# Patient Record
Sex: FEMALE | Race: WHITE | NOT HISPANIC OR LATINO | Employment: UNEMPLOYED | URBAN - METROPOLITAN AREA
[De-identification: names, ages, dates, MRNs, and addresses within clinical notes are randomized per-mention and may not be internally consistent; named-entity substitution may affect disease eponyms.]

---

## 2017-01-06 ENCOUNTER — POSTPARTUM VISIT (OUTPATIENT)
Dept: OBSTETRICS AND GYNECOLOGY | Facility: CLINIC | Age: 27
End: 2017-01-06
Payer: MEDICAID

## 2017-01-06 VITALS
WEIGHT: 212 LBS | DIASTOLIC BLOOD PRESSURE: 60 MMHG | SYSTOLIC BLOOD PRESSURE: 122 MMHG | HEART RATE: 70 BPM | BODY MASS INDEX: 40.06 KG/M2 | RESPIRATION RATE: 13 BRPM

## 2017-01-06 DIAGNOSIS — Z48.02 ENCOUNTER FOR STAPLE REMOVAL: Primary | ICD-10-CM

## 2017-01-06 DIAGNOSIS — Z09 POSTOPERATIVE FOLLOW-UP: ICD-10-CM

## 2017-01-06 PROCEDURE — 99999 PR PBB SHADOW E&M-EST. PATIENT-LVL III: CPT | Mod: PBBFAC,,, | Performed by: OBSTETRICS & GYNECOLOGY

## 2017-01-06 PROCEDURE — 99213 OFFICE O/P EST LOW 20 MIN: CPT | Mod: PBBFAC | Performed by: OBSTETRICS & GYNECOLOGY

## 2017-01-06 PROCEDURE — 99024 POSTOP FOLLOW-UP VISIT: CPT | Mod: ,,, | Performed by: OBSTETRICS & GYNECOLOGY

## 2017-01-06 NOTE — PROGRESS NOTES
Subjective:       Patient ID: Kate Gomez is a 26 y.o. female.    Chief Complaint:  Suture / Staple Removal (10 days PO )      History of Present Illness  Patient presents for staple removal following  section.  Patient states her pain is under control and she has minimal bleeding.  She missed a voiding well and has good appetite.    Menstrual History:  OB History      Para Term  AB TAB SAB Ectopic Multiple Living    2 2 2      0 2         Menarche age:   Patient's last menstrual period was 2016 (exact date).         Review of Systems  Review of Systems   Constitutional: Negative for activity change, appetite change, chills, diaphoresis, fatigue, fever and unexpected weight change.   HENT: Negative for congestion, dental problem, drooling, ear discharge, ear pain, facial swelling, hearing loss, mouth sores, nosebleeds, postnasal drip, rhinorrhea, sinus pressure, sneezing, sore throat, tinnitus, trouble swallowing and voice change.    Eyes: Negative for photophobia, pain, discharge, redness, itching and visual disturbance.   Respiratory: Negative for apnea, cough, choking, chest tightness, shortness of breath, wheezing and stridor.    Cardiovascular: Negative for chest pain, palpitations and leg swelling.   Gastrointestinal: Negative for abdominal distention, abdominal pain, anal bleeding, blood in stool, constipation, diarrhea, nausea, rectal pain and vomiting.   Endocrine: Negative for cold intolerance, heat intolerance, polydipsia, polyphagia and polyuria.   Genitourinary: Negative for decreased urine volume, difficulty urinating, dyspareunia, dysuria, enuresis, flank pain, frequency, genital sores, hematuria, menstrual problem, pelvic pain, urgency, vaginal bleeding, vaginal discharge and vaginal pain.   Musculoskeletal: Negative for arthralgias, back pain, gait problem, joint swelling, myalgias, neck pain and neck stiffness.   Skin: Negative for color change, pallor, rash and wound.    Allergic/Immunologic: Negative for environmental allergies, food allergies and immunocompromised state.   Neurological: Negative for dizziness, tremors, seizures, syncope, facial asymmetry, speech difficulty, weakness, light-headedness, numbness and headaches.   Hematological: Negative for adenopathy. Does not bruise/bleed easily.   Psychiatric/Behavioral: Negative for agitation, behavioral problems, confusion, decreased concentration, dysphoric mood, hallucinations, self-injury, sleep disturbance and suicidal ideas. The patient is not nervous/anxious and is not hyperactive.            Objective:    Physical Exam   Nursing note and vitals reviewed.      Staples removed, Steri-Strips placed.  Incision clean dry and intact.  Abdomen soft nontender  Assessment:        1. Encounter for staple removal    2. Postoperative follow-up               Plan:        Kate was seen today for suture / staple removal.    Diagnoses and all orders for this visit:    Encounter for staple removal    Postoperative follow-up

## 2017-01-06 NOTE — MR AVS SNAPSHOT
[facility]  [address] [place] [id number]  Phone: [phone]                  Kate Gomez   2017 12:30 PM   Postpartum Visit    Description:  Female : 1990   Provider:  Isael Greenfield MD   Department:  [facility]           Reason for Visit     Suture / Staple Removal           Diagnoses this Visit        Comments    Encounter for staple removal    -  Primary     Postoperative follow-up                To Do List           Goals (5 Years of Data)     None      Follow-Up and Disposition     Return in about 3 weeks (around 2017).      [facility]     [facility] On Call Nurse Care Line -  Assistance  Registered nurses in the [facility] provide clinical advisement, health education, appointment booking, and other advisory services.  Call for this free service at [phone].             Medications           Message regarding Medications     Verify the changes and/or additions to your medication regime listed below are the same as discussed with your clinician today.  If any of these changes or additions are incorrect, please notify your healthcare provider.             Verify that the below list of medications is an accurate representation of the medications you are currently taking.  If none reported, the list may be blank. If incorrect, please contact your healthcare provider. Carry this list with you in case of emergency.           Current Medications     ibuprofen (ADVIL,MOTRIN) 600 MG tablet Take 1 tablet (600 mg total) by mouth every 6 (six) hours as needed for Pain.    oxycodone-acetaminophen (PERCOCET) 5-325 mg per tablet Take 1 tablet by mouth every 4 (four) hours as needed.    PRENATAL VIT W-CA,FE,FA,<1 MG, (PRENATAL VITAMIN ORAL) Take 1 tablet by mouth once daily. OTC           Clinical Reference Information           Prenatal Vitals     Enc. Date GA Prenatal Vitals Prenatal Pulse Pain Level Urine Albumin/Glucose Edema  "Presentation Dilation/Effacement/Station    17 39w1d 122/60 / 96.2 kg (212 lb)  70         16 39w1d Admission Dx: Previous  section Dept: [facility]    16 38w1d 122/67 / 101.6 kg (224 lb) 38 cm / 149 / Present 72 2 Negative / Negative None / None / None Vertex 0 / 0 / -4    16 37w1d 122/78 / 101.2 kg (223 lb) 37 cm / 156 / Present 78 3 Trace / Negative None / None / None Vertex 0 / 0 / -4    16 36w1d 124/67 / 100.2 kg (221 lb) 36 cm / 145 / Present 70 0 Negative / Negative None / None / None      16 34w1d 122/67 / 99.8 kg (220 lb) 34 cm / 148 / Present 72 0 Negative / Negative None / None / None      16 32w1d 102/67 / 98.9 kg (218 lb) 32 cm / 142 / Present 77 3 Negative / Negative None / None / None      10/24/16 30w0d 126/76 / 100.2 kg (221 lb) 30 cm / 147 / Present 79 5 Negative / 1+ None / None / None      10/4/16 27w1d 118/74 / 100.3 kg (221 lb 3.2 oz) 29 cm / 158 / Present 85 5 Negative / Negative None / None / None      16 24w1d 102/64 / 100.2 kg (221 lb) 25 cm / 149 / Present 79 5 Negative / Negative None / None / None      16 20w1d 114/62 / 99.3 kg (219 lb)  80 0 Negative / Negative       16 16w1d 120/67 / 98 kg (216 lb) 16 cm / 147 79 0 Negative / Negative None / None / None      16 12w0d 112/62 / 99.3 kg (219 lb)  / us / Absent 79 0 Negative / Negative None / None / None         TWG: -0.907 kg (-2 lb)   Pregravid weight: 102.5 kg (226 lb)   Number of babies: 1   Height: 5' 1" (1.549 m)   BMI: 42.7       Vital Signs - Last Recorded  Most recent update: 2017 12:15 PM by Antonella Andujar MA    BP Pulse Resp Wt LMP Breastfeeding?    122/60 70 13 96.2 kg (212 lb) 2016 (Exact Date) Yes    BMI                40.06 kg/m2          Allergies as of 2017     No Known Allergies      Immunizations Administered on Date of Encounter - 2017     None      Smoking Cessation     If you would like to quit smoking:   You may be eligible for " free services if you are a [place] resident and started smoking cigarettes before September 1, 1988.  Call the [employer]oking [employer]essation [employer] ([employer]) toll free at (966) 278-0687 or (924) [phone].   Call 2-179-QUIT-NOW if you do not meet the above criteria.

## 2019-04-19 ENCOUNTER — HOSPITAL ENCOUNTER (EMERGENCY)
Facility: HOSPITAL | Age: 29
Discharge: HOME | End: 2019-04-19
Attending: SURGERY
Payer: MEDICAID

## 2019-04-19 VITALS
SYSTOLIC BLOOD PRESSURE: 139 MMHG | TEMPERATURE: 99 F | HEART RATE: 95 BPM | RESPIRATION RATE: 18 BRPM | DIASTOLIC BLOOD PRESSURE: 73 MMHG | OXYGEN SATURATION: 98 %

## 2019-04-19 DIAGNOSIS — J00 ACUTE NASOPHARYNGITIS: Primary | ICD-10-CM

## 2019-04-19 PROCEDURE — 99284 EMERGENCY DEPT VISIT MOD MDM: CPT | Mod: 25

## 2019-04-19 PROCEDURE — 96372 THER/PROPH/DIAG INJ SC/IM: CPT

## 2019-04-19 PROCEDURE — 63600175 PHARM REV CODE 636 W HCPCS: Performed by: SURGERY

## 2019-04-19 RX ORDER — DOXYCYCLINE 100 MG/1
100 CAPSULE ORAL 2 TIMES DAILY
Qty: 20 CAPSULE | Refills: 0 | Status: SHIPPED | OUTPATIENT
Start: 2019-04-19 | End: 2019-04-29

## 2019-04-19 RX ORDER — BENZONATATE 100 MG/1
200 CAPSULE ORAL 3 TIMES DAILY PRN
Qty: 20 CAPSULE | Refills: 0 | Status: SHIPPED | OUTPATIENT
Start: 2019-04-19 | End: 2019-04-29

## 2019-04-19 RX ORDER — METHYLPREDNISOLONE 4 MG/1
TABLET ORAL
Qty: 1 PACKAGE | Refills: 0 | Status: SHIPPED | OUTPATIENT
Start: 2019-04-19 | End: 2022-09-23

## 2019-04-19 RX ORDER — METHYLPREDNISOLONE SOD SUCC 125 MG
125 VIAL (EA) INJECTION
Status: COMPLETED | OUTPATIENT
Start: 2019-04-19 | End: 2019-04-19

## 2019-04-19 RX ADMIN — METHYLPREDNISOLONE SODIUM SUCCINATE 125 MG: 125 INJECTION, POWDER, FOR SOLUTION INTRAMUSCULAR; INTRAVENOUS at 03:04

## 2019-04-19 NOTE — ED TRIAGE NOTES
28 y.o. female presents to ER   Chief Complaint   Patient presents with    Cough   Pt reports dry cough for two days. No acute distress noted.

## 2019-04-19 NOTE — ED PROVIDER NOTES
[facility]                                                 Chief Complaint  28 y.o. female with Cough    History of Present Illness  Kate Gomez presents to the emergency room with nasal congestion  Patient has cough and nasal congestion, has no wheezing on ER triage  Patient has no fever, no shortness of breath, long-time smoker per history  Patient on exam has clear nasal drainage nasal mucosa erythema here  Patient has no nausea vomiting diarrhea, denies any flu-like symptoms    The history is provided by the patient   device was not used during this ER visit  History reviewed. No pertinent past medical history.   Surgeries:   No Known Allergies     Review of Systems and Physical Exam      Review of Systems  -- Constitution - no fever, denies fatigue, no weakness, no chills  -- Eyes - no tearing or redness, no visual disturbance  -- Ear, Nose - sneezing, nasal congestion and clear discharge   -- Mouth,Throat - no sore throat, no toothache, normal voice, normal swallowing  -- Respiratory - cough and congestion, no shortness of breath, no MENDEZ  -- Cardiovascular - denies chest pain, no palpitations, denies claudication  -- Gastrointestinal - denies abdominal pain, nausea, vomiting, or diarrhea  -- Genitourinary - no dysuria, no hematuria, no flank pain, no bladder pain  -- Musculoskeletal - denies back pain, negative for trauma or injury  -- Neurological - no headache, denies weakness or seizure; no LOC  -- Skin - denies pallor, rash, or changes in skin. no hives or welts noted    Vital Signs  Oral temperature is 98.7 °F (37.1 °C).   Her blood pressure is 139/73 and her pulse is 95.   Her respiration is 18 and oxygen saturation is 98%.     Physical Exam  -- Nursing note and vitals reviewed  -- Constitutional: Appears well-developed and well-nourished  -- Head: Atraumatic. Normocephalic. No obvious abnormality  -- Eyes: Pupils are equal and reactive to light. Normal  conjunctiva and lids  -- Nose: nasal mucosa erythema and edema; clear nasal discharge noted   -- Throat: Mucous membranes moist, pharynx normal, normal tonsils. No lesions   -- Ears: External ears and TM normal bilaterally. Normal hearing and no drainage  -- Neck: Normal range of motion. Neck supple. No masses, trachea midline  -- Cardiac: Normal rate, regular rhythm and normal heart sounds  -- Pulmonary: Normal respiratory effort, breath sounds clear to auscultation  -- Abdominal: Soft, no tenderness. Normal bowel sounds. Normal liver edge  -- Musculoskeletal: Normal range of motion, no effusions. Joints stable   -- Neurological: No focal deficits. Showed good interaction with staff  -- Skin: Warm and dry. No evidence of rash or cellulitis    Emergency Room Course      Treatment and Evaluation  --  mg Solumedrol given today in the ER    Diagnosis  -- The encounter diagnosis was Acute nasopharyngitis.    Disposition and Plan  -- Disposition: home  -- Condition: stable  -- Follow-up: Patient to follow up with MD in 1-2 days.  -- I advised the patient that we have found no life threatening condition today  -- At this time, I believe the patient is clinically stable for discharge.   -- The patient acknowledges that close follow up with a MD is required   -- Patient agrees to comply with all instruction and direction given in the ER    This note is dictated on M*Modal word recognition program.  There are word recognition mistakes that are occasionally missed on review.          Luis Smith MD  04/19/19 [id number]

## 2019-04-21 ENCOUNTER — NURSE TRIAGE (OUTPATIENT)
Dept: ADMINISTRATIVE | Facility: CLINIC | Age: 29
End: 2019-04-21

## 2019-04-21 NOTE — TELEPHONE ENCOUNTER
Reason for Disposition   Caller has NON-URGENT medication question about med that PCP prescribed and triager unable to answer question    Protocols used: MEDICATION QUESTION CALL-A-AH

## 2022-09-23 ENCOUNTER — OFFICE VISIT (OUTPATIENT)
Dept: OBSTETRICS AND GYNECOLOGY | Facility: CLINIC | Age: 32
End: 2022-09-23

## 2022-09-23 VITALS
BODY MASS INDEX: 47.65 KG/M2 | DIASTOLIC BLOOD PRESSURE: 78 MMHG | SYSTOLIC BLOOD PRESSURE: 118 MMHG | HEIGHT: 61 IN | WEIGHT: 252.38 LBS | HEART RATE: 94 BPM

## 2022-09-23 DIAGNOSIS — Z01.419 ENCOUNTER FOR GYNECOLOGICAL EXAMINATION WITHOUT ABNORMAL FINDING: Primary | ICD-10-CM

## 2022-09-23 DIAGNOSIS — Z12.4 CERVICAL CANCER SCREENING: ICD-10-CM

## 2022-09-23 PROCEDURE — 99385 PR PREVENTIVE VISIT,NEW,18-39: ICD-10-PCS | Mod: S$PBB,,, | Performed by: OBSTETRICS & GYNECOLOGY

## 2022-09-23 PROCEDURE — 99999 PR PBB SHADOW E&M-NEW PATIENT-LVL III: CPT | Mod: PBBFAC,,, | Performed by: OBSTETRICS & GYNECOLOGY

## 2022-09-23 PROCEDURE — 99203 OFFICE O/P NEW LOW 30 MIN: CPT | Mod: PBBFAC | Performed by: OBSTETRICS & GYNECOLOGY

## 2022-09-23 PROCEDURE — 99999 PR PBB SHADOW E&M-NEW PATIENT-LVL III: ICD-10-PCS | Mod: PBBFAC,,, | Performed by: OBSTETRICS & GYNECOLOGY

## 2022-09-23 PROCEDURE — 99385 PREV VISIT NEW AGE 18-39: CPT | Mod: S$PBB,,, | Performed by: OBSTETRICS & GYNECOLOGY

## 2022-09-23 PROCEDURE — 88175 CYTOPATH C/V AUTO FLUID REDO: CPT | Performed by: OBSTETRICS & GYNECOLOGY

## 2022-09-23 NOTE — PROGRESS NOTES
Subjective:       Patient ID: Kate Gmoez is a 31 y.o. female.    Chief Complaint:  Annual Exam (Well woman exam)      History of Present Illness  Patient presents for annual exam.  Patient is interested in trying to conceive again.  It has been 5 years since her last delivery.  Patient has been trying for a few months.  She does admit irregular cycles once a month lasting about 5 days.  She is otherwise without gyn complaints.    Menstrual History:  OB History          2    Para   2    Term   2            AB        Living   2         SAB        IAB        Ectopic        Multiple   0    Live Births   2                Menarche age:  Patient's last menstrual period was 09/15/2022.         Review of Systems  Review of Systems        Objective:      Physical Exam        Assessment:        1. Cervical cancer screening                Plan:         Kate was seen today for annual exam.    Diagnoses and all orders for this visit:    Cervical cancer screening  -     Liquid-Based Pap Smear, Screening

## 2022-09-28 ENCOUNTER — PATIENT MESSAGE (OUTPATIENT)
Dept: OBSTETRICS AND GYNECOLOGY | Facility: CLINIC | Age: 32
End: 2022-09-28

## 2022-09-28 LAB
FINAL PATHOLOGIC DIAGNOSIS: NORMAL
Lab: NORMAL

## 2023-03-30 ENCOUNTER — TELEPHONE (OUTPATIENT)
Dept: OBSTETRICS AND GYNECOLOGY | Facility: CLINIC | Age: 33
End: 2023-03-30
Payer: MEDICAID

## 2023-03-30 NOTE — TELEPHONE ENCOUNTER
----- Message from Kylah Mcgill MA sent at 3/30/2023 12:22 PM CDT -----  Kate Gomez  MRN: [id number]  Home Phone      [phone]  Work Phone      Not on file.  Mobile          [phone]    Patient Care Team:  Primary Doctor No as PCP - General  Isael Greenfield MD as Obstetrician (Obstetrics)  OB? Yes, Unknown  What phone number can you be reached at? [phone]  Message: Needs to make appt for dx preg.

## 2023-03-31 ENCOUNTER — OFFICE VISIT (OUTPATIENT)
Dept: OBSTETRICS AND GYNECOLOGY | Facility: CLINIC | Age: 33
End: 2023-03-31
Payer: MEDICAID

## 2023-03-31 ENCOUNTER — LAB VISIT (OUTPATIENT)
Dept: LAB | Facility: HOSPITAL | Age: 33
End: 2023-03-31
Attending: STUDENT IN AN ORGANIZED HEALTH CARE EDUCATION/TRAINING PROGRAM
Payer: MEDICAID

## 2023-03-31 VITALS
WEIGHT: 251.81 LBS | HEIGHT: 61 IN | SYSTOLIC BLOOD PRESSURE: 122 MMHG | HEART RATE: 54 BPM | DIASTOLIC BLOOD PRESSURE: 68 MMHG | BODY MASS INDEX: 47.54 KG/M2

## 2023-03-31 DIAGNOSIS — F12.90 MARIJUANA USE: ICD-10-CM

## 2023-03-31 DIAGNOSIS — Z32.01 POSITIVE URINE PREGNANCY TEST: ICD-10-CM

## 2023-03-31 DIAGNOSIS — Z11.3 SCREENING EXAMINATION FOR STD (SEXUALLY TRANSMITTED DISEASE): ICD-10-CM

## 2023-03-31 DIAGNOSIS — N91.2 AMENORRHEA: Primary | ICD-10-CM

## 2023-03-31 LAB
ABO + RH BLD: NORMAL
B-HCG UR QL: POSITIVE
BASOPHILS # BLD AUTO: 0.03 K/UL (ref 0–0.2)
BASOPHILS NFR BLD: 0.4 % (ref 0–1.9)
BLD GP AB SCN CELLS X3 SERPL QL: NORMAL
CTP QC/QA: YES
DIFFERENTIAL METHOD: ABNORMAL
EOSINOPHIL # BLD AUTO: 0 K/UL (ref 0–0.5)
EOSINOPHIL NFR BLD: 0.4 % (ref 0–8)
ERYTHROCYTE [DISTWIDTH] IN BLOOD BY AUTOMATED COUNT: 14.5 % (ref 11.5–14.5)
HBV SURFACE AG SERPL QL IA: NORMAL
HCT VFR BLD AUTO: 42.1 % (ref 37–48.5)
HCV AB SERPL QL IA: NORMAL
HGB BLD-MCNC: 13.4 G/DL (ref 12–16)
HIV 1+2 AB+HIV1 P24 AG SERPL QL IA: NORMAL
IMM GRANULOCYTES # BLD AUTO: 0.02 K/UL (ref 0–0.04)
IMM GRANULOCYTES NFR BLD AUTO: 0.3 % (ref 0–0.5)
LYMPHOCYTES # BLD AUTO: 2.1 K/UL (ref 1–4.8)
LYMPHOCYTES NFR BLD: 26.2 % (ref 18–48)
MCH RBC QN AUTO: 24.7 PG (ref 27–31)
MCHC RBC AUTO-ENTMCNC: 31.8 G/DL (ref 32–36)
MCV RBC AUTO: 78 FL (ref 82–98)
MONOCYTES # BLD AUTO: 0.3 K/UL (ref 0.3–1)
MONOCYTES NFR BLD: 4.3 % (ref 4–15)
NEUTROPHILS # BLD AUTO: 5.4 K/UL (ref 1.8–7.7)
NEUTROPHILS NFR BLD: 68.4 % (ref 38–73)
NRBC BLD-RTO: 0 /100 WBC
PLATELET # BLD AUTO: 314 K/UL (ref 150–450)
PMV BLD AUTO: 10 FL (ref 9.2–12.9)
RBC # BLD AUTO: 5.42 M/UL (ref 4–5.4)
RPR SER QL: NORMAL
SPECIMEN OUTDATE: NORMAL
TSH SERPL DL<=0.005 MIU/L-ACNC: 1.27 UIU/ML (ref 0.4–4)
WBC # BLD AUTO: 7.85 K/UL (ref 3.9–12.7)

## 2023-03-31 PROCEDURE — 86900 BLOOD TYPING SEROLOGIC ABO: CPT | Performed by: OBSTETRICS & GYNECOLOGY

## 2023-03-31 PROCEDURE — 85025 COMPLETE CBC W/AUTO DIFF WBC: CPT | Performed by: OBSTETRICS & GYNECOLOGY

## 2023-03-31 PROCEDURE — 99213 OFFICE O/P EST LOW 20 MIN: CPT | Mod: PBBFAC | Performed by: OBSTETRICS & GYNECOLOGY

## 2023-03-31 PROCEDURE — 81025 URINE PREGNANCY TEST: CPT | Mod: PBBFAC | Performed by: OBSTETRICS & GYNECOLOGY

## 2023-03-31 PROCEDURE — 99999 PR PBB SHADOW E&M-EST. PATIENT-LVL III: ICD-10-PCS | Mod: PBBFAC,,, | Performed by: OBSTETRICS & GYNECOLOGY

## 2023-03-31 PROCEDURE — 87340 HEPATITIS B SURFACE AG IA: CPT | Performed by: OBSTETRICS & GYNECOLOGY

## 2023-03-31 PROCEDURE — 99204 OFFICE O/P NEW MOD 45 MIN: CPT | Mod: S$PBB,TH,, | Performed by: OBSTETRICS & GYNECOLOGY

## 2023-03-31 PROCEDURE — 86803 HEPATITIS C AB TEST: CPT | Performed by: OBSTETRICS & GYNECOLOGY

## 2023-03-31 PROCEDURE — 84443 ASSAY THYROID STIM HORMONE: CPT | Performed by: OBSTETRICS & GYNECOLOGY

## 2023-03-31 PROCEDURE — 99204 PR OFFICE/OUTPT VISIT, NEW, LEVL IV, 45-59 MIN: ICD-10-PCS | Mod: S$PBB,TH,, | Performed by: OBSTETRICS & GYNECOLOGY

## 2023-03-31 PROCEDURE — 86762 RUBELLA ANTIBODY: CPT | Performed by: OBSTETRICS & GYNECOLOGY

## 2023-03-31 PROCEDURE — 86592 SYPHILIS TEST NON-TREP QUAL: CPT | Performed by: OBSTETRICS & GYNECOLOGY

## 2023-03-31 PROCEDURE — 36415 COLL VENOUS BLD VENIPUNCTURE: CPT | Performed by: OBSTETRICS & GYNECOLOGY

## 2023-03-31 PROCEDURE — 87591 N.GONORRHOEAE DNA AMP PROB: CPT | Performed by: OBSTETRICS & GYNECOLOGY

## 2023-03-31 PROCEDURE — 87389 HIV-1 AG W/HIV-1&-2 AB AG IA: CPT | Performed by: OBSTETRICS & GYNECOLOGY

## 2023-03-31 PROCEDURE — 99999 PR PBB SHADOW E&M-EST. PATIENT-LVL III: CPT | Mod: PBBFAC,,, | Performed by: OBSTETRICS & GYNECOLOGY

## 2023-03-31 NOTE — PROGRESS NOTES
Subjective:       Patient ID: Kate Gomez is a 32 y.o. female.    Chief Complaint:  Possible Pregnancy (Pt here to diagnose pregnancy )      History of Present Illness  Patient presents with a positive urine pregnancy test.  According the patient's last menstrual period of  she would be 5 weeks and 3 days today.  This is the patient's 3rd pregnancy.  She is had 2 previous  sections.  She is otherwise without gyn complaints.    Menstrual History:  OB History          2    Para   2    Term   2            AB        Living   2         SAB        IAB        Ectopic        Multiple   0    Live Births   2                Menarche age:  Patient's last menstrual period was 2023.         Review of Systems  Review of Systems   Constitutional:  Negative for activity change, appetite change, chills, diaphoresis, fatigue, fever and unexpected weight change.   HENT:  Negative for congestion, dental problem, drooling, ear discharge, ear pain, facial swelling, hearing loss, mouth sores, nosebleeds, postnasal drip, rhinorrhea, sinus pressure, sneezing, sore throat, tinnitus, trouble swallowing and voice change.    Eyes:  Negative for photophobia, pain, discharge, redness, itching and visual disturbance.   Respiratory:  Negative for apnea, cough, choking, chest tightness, shortness of breath, wheezing and stridor.    Cardiovascular:  Negative for chest pain, palpitations and leg swelling.   Gastrointestinal:  Negative for abdominal distention, abdominal pain, anal bleeding, blood in stool, constipation, diarrhea, nausea, rectal pain and vomiting.   Endocrine: Negative for cold intolerance, heat intolerance, polydipsia, polyphagia and polyuria.   Genitourinary:  Negative for decreased urine volume, difficulty urinating, dyspareunia, dysuria, enuresis, flank pain, frequency, genital sores, hematuria, menstrual problem, pelvic pain, urgency, vaginal bleeding, vaginal discharge and vaginal pain.    Musculoskeletal:  Negative for arthralgias, back pain, gait problem, joint swelling, myalgias, neck pain and neck stiffness.   Skin:  Negative for color change, pallor, rash and wound.   Allergic/Immunologic: Negative for environmental allergies, food allergies and immunocompromised state.   Neurological:  Negative for dizziness, tremors, seizures, syncope, facial asymmetry, speech difficulty, weakness, light-headedness, numbness and headaches.   Hematological:  Negative for adenopathy. Does not bruise/bleed easily.   Psychiatric/Behavioral:  Negative for agitation, behavioral problems, confusion, decreased concentration, dysphoric mood, hallucinations, self-injury, sleep disturbance and suicidal ideas. The patient is not nervous/anxious and is not hyperactive.          Objective:      Physical Exam  Vitals and nursing note reviewed.   Constitutional:       Appearance: She is well-developed.   HENT:      Head: Normocephalic.   Neck:      Thyroid: No thyromegaly.   Cardiovascular:      Rate and Rhythm: Normal rate and regular rhythm.   Pulmonary:      Effort: Pulmonary effort is normal.      Breath sounds: Normal breath sounds.   Abdominal:      General: Bowel sounds are normal.      Palpations: Abdomen is soft. There is no mass.      Tenderness: There is no abdominal tenderness.      Hernia: There is no hernia in the left inguinal area.   Genitourinary:     Vagina: Normal. No foreign body. No vaginal discharge or tenderness.      Cervix: No cervical motion tenderness, discharge or friability.      Uterus: Not enlarged and not tender.       Adnexa:         Right: No mass, tenderness or fullness.          Left: No mass, tenderness or fullness.        Rectum: No external hemorrhoid.   Musculoskeletal:         General: Normal range of motion.      Cervical back: Normal range of motion.   Skin:     General: Skin is dry.   Neurological:      Mental Status: She is alert and oriented to person, place, and time.      Deep  Tendon Reflexes: Reflexes are normal and symmetric.   Psychiatric:         Behavior: Behavior normal.         Thought Content: Thought content normal.         Judgment: Judgment normal.           Assessment:        1. Amenorrhea    2. Screening examination for STD (sexually transmitted disease)    3. Positive urine pregnancy test                Plan:         Kate was seen today for possible pregnancy.    Diagnoses and all orders for this visit:    Amenorrhea  -     POCT urine pregnancy    Screening examination for STD (sexually transmitted disease)  -     C. trachomatis/N. gonorrhoeae by AMP DNA [facility]; Cervix    Positive urine pregnancy test  -     CBC Auto Differential; Future  -     Hepatitis B Surface Antigen; Future  -     HIV 1/2 Ag/Ab (4th Gen); Future  -     RPR; Future  -     Rubella Antibody, IgG; Future  -     TSH; Future  -     Type & Screen; Future  -     Hepatitis C Antibody; Future  -     US OB/GYN Procedure ([facility]point) - Extended List; Future

## 2023-04-03 LAB
RUBV IGG SER-ACNC: 13.6 IU/ML
RUBV IGG SER-IMP: REACTIVE

## 2023-04-04 LAB
C TRACH DNA SPEC QL NAA+PROBE: NOT DETECTED
N GONORRHOEA DNA SPEC QL NAA+PROBE: NOT DETECTED

## 2023-04-12 ENCOUNTER — PROCEDURE VISIT (OUTPATIENT)
Dept: OBSTETRICS AND GYNECOLOGY | Facility: CLINIC | Age: 33
End: 2023-04-12
Payer: MEDICAID

## 2023-04-12 DIAGNOSIS — Z32.01 POSITIVE URINE PREGNANCY TEST: ICD-10-CM

## 2023-04-12 PROCEDURE — 76817 TRANSVAGINAL US OBSTETRIC: CPT | Mod: PBBFAC | Performed by: OBSTETRICS & GYNECOLOGY

## 2023-04-12 PROCEDURE — 76817 US OB/GYN EXTENDED PROCEDURE (VIEWPOINT): ICD-10-PCS | Mod: 26,S$PBB,, | Performed by: OBSTETRICS & GYNECOLOGY

## 2023-04-13 ENCOUNTER — PATIENT MESSAGE (OUTPATIENT)
Dept: OBSTETRICS AND GYNECOLOGY | Facility: CLINIC | Age: 33
End: 2023-04-13
Payer: MEDICAID

## 2023-04-13 ENCOUNTER — LAB VISIT (OUTPATIENT)
Dept: LAB | Facility: HOSPITAL | Age: 33
End: 2023-04-13
Attending: OBSTETRICS & GYNECOLOGY
Payer: MEDICAID

## 2023-04-13 DIAGNOSIS — R93.89 ABNORMAL ULTRASOUND: Primary | ICD-10-CM

## 2023-04-13 DIAGNOSIS — R93.89 ABNORMAL ULTRASOUND: ICD-10-CM

## 2023-04-13 LAB — HCG INTACT+B SERPL-ACNC: NORMAL MIU/ML

## 2023-04-13 PROCEDURE — 36415 COLL VENOUS BLD VENIPUNCTURE: CPT | Performed by: OBSTETRICS & GYNECOLOGY

## 2023-04-13 PROCEDURE — 84702 CHORIONIC GONADOTROPIN TEST: CPT | Performed by: OBSTETRICS & GYNECOLOGY

## 2023-04-13 NOTE — TELEPHONE ENCOUNTER
Pt messaged stating her hcg has resulted and would like to know if its a good level, please advise.

## 2023-04-28 DIAGNOSIS — Z36.89 CONFIRM FETAL CARDIAC ACTIVITY USING ULTRASOUND: Primary | ICD-10-CM

## 2023-05-01 ENCOUNTER — PROCEDURE VISIT (OUTPATIENT)
Dept: OBSTETRICS AND GYNECOLOGY | Facility: CLINIC | Age: 33
End: 2023-05-01
Payer: MEDICAID

## 2023-05-01 DIAGNOSIS — Z36.89 CONFIRM FETAL CARDIAC ACTIVITY USING ULTRASOUND: ICD-10-CM

## 2023-05-01 PROCEDURE — 76801 US OB/GYN EXTENDED PROCEDURE (VIEWPOINT): ICD-10-PCS | Mod: 26,S$PBB,, | Performed by: OBSTETRICS & GYNECOLOGY

## 2023-05-01 PROCEDURE — 76801 OB US < 14 WKS SINGLE FETUS: CPT | Mod: PBBFAC | Performed by: OBSTETRICS & GYNECOLOGY

## 2023-05-02 ENCOUNTER — INITIAL PRENATAL (OUTPATIENT)
Dept: OBSTETRICS AND GYNECOLOGY | Facility: CLINIC | Age: 33
End: 2023-05-02
Payer: MEDICAID

## 2023-05-02 VITALS
HEART RATE: 97 BPM | DIASTOLIC BLOOD PRESSURE: 68 MMHG | WEIGHT: 252 LBS | BODY MASS INDEX: 47.61 KG/M2 | SYSTOLIC BLOOD PRESSURE: 120 MMHG

## 2023-05-02 DIAGNOSIS — Z34.81 ENCOUNTER FOR SUPERVISION OF OTHER NORMAL PREGNANCY IN FIRST TRIMESTER: ICD-10-CM

## 2023-05-02 DIAGNOSIS — Z98.891 PREVIOUS CESAREAN SECTION: Primary | ICD-10-CM

## 2023-05-02 DIAGNOSIS — Z3A.08 8 WEEKS GESTATION OF PREGNANCY: ICD-10-CM

## 2023-05-02 PROCEDURE — 99213 PR OFFICE/OUTPT VISIT, EST, LEVL III, 20-29 MIN: ICD-10-PCS | Mod: TH,S$PBB,, | Performed by: OBSTETRICS & GYNECOLOGY

## 2023-05-02 PROCEDURE — 99999 PR PBB SHADOW E&M-EST. PATIENT-LVL III: ICD-10-PCS | Mod: PBBFAC,,, | Performed by: OBSTETRICS & GYNECOLOGY

## 2023-05-02 PROCEDURE — 99213 OFFICE O/P EST LOW 20 MIN: CPT | Mod: PBBFAC,TH | Performed by: OBSTETRICS & GYNECOLOGY

## 2023-05-02 PROCEDURE — 99213 OFFICE O/P EST LOW 20 MIN: CPT | Mod: TH,S$PBB,, | Performed by: OBSTETRICS & GYNECOLOGY

## 2023-05-02 PROCEDURE — 99999 PR PBB SHADOW E&M-EST. PATIENT-LVL III: CPT | Mod: PBBFAC,,, | Performed by: OBSTETRICS & GYNECOLOGY

## 2023-05-02 NOTE — PROGRESS NOTES
Patient had previously admitted to smoking marijuana on her last visit but states she has now stopped.  She reports she is not smoked marijuana since discovering she was pregnant.  Patient doing well. No vaginal bleeding or cramping noted. Discussed the need for an anatomy scan between 18-20 weeks. Discussed genetic testing/screening; . RTC in 4 weeks.    Vitals signs, FHTs, urine dip, and PE findings documented, reviewed and available in OB flow chart.       I spent a total of 20 minutes on the day of the visit.This includes face to face time and non-face to face time preparing to see the patient (eg, review of tests), Obtaining and/or reviewing separately obtained history, Documenting clinical information in the electronic or other health record, Independently interpreting resultsand communicating results to the patient/family/caregiver, or Care coordination.      Coffective counseling sheet Get Ready discussed with mother. Reinforced avoiding induction of labor unless medically indicated as well as comfort measures during labor.  Encouraged mother to download Coffective mobile ramsey if she has not already done so. Mother verbalizes understanding.

## 2023-05-06 ENCOUNTER — PATIENT MESSAGE (OUTPATIENT)
Dept: OBSTETRICS AND GYNECOLOGY | Facility: CLINIC | Age: 33
End: 2023-05-06
Payer: MEDICAID

## 2023-05-15 ENCOUNTER — PATIENT MESSAGE (OUTPATIENT)
Dept: OBSTETRICS AND GYNECOLOGY | Facility: CLINIC | Age: 33
End: 2023-05-15
Payer: MEDICAID

## 2023-05-25 ENCOUNTER — PATIENT MESSAGE (OUTPATIENT)
Dept: OBSTETRICS AND GYNECOLOGY | Facility: CLINIC | Age: 33
End: 2023-05-25
Payer: MEDICAID

## 2023-05-25 ENCOUNTER — HOSPITAL ENCOUNTER (EMERGENCY)
Facility: HOSPITAL | Age: 33
Discharge: HOME | End: 2023-05-25
Attending: STUDENT IN AN ORGANIZED HEALTH CARE EDUCATION/TRAINING PROGRAM
Payer: MEDICAID

## 2023-05-25 VITALS
OXYGEN SATURATION: 99 % | WEIGHT: 248.44 LBS | HEIGHT: 61 IN | SYSTOLIC BLOOD PRESSURE: 127 MMHG | DIASTOLIC BLOOD PRESSURE: 87 MMHG | HEART RATE: 107 BPM | BODY MASS INDEX: 46.91 KG/M2 | TEMPERATURE: 98 F | RESPIRATION RATE: 18 BRPM

## 2023-05-25 DIAGNOSIS — R10.30 LOWER ABDOMINAL PAIN: ICD-10-CM
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
## 2023-05-25 DIAGNOSIS — Z3A.11 11 WEEKS GESTATION OF PREGNANCY: ICD-10-CM

## 2023-05-25 PROCEDURE — 99284 EMERGENCY DEPT VISIT MOD MDM: CPT | Mod: 25

## 2023-05-25 NOTE — ED TRIAGE NOTES
C/o losing balance and falling last night while trying to grab something under her bed. Patient reports lower abdominal pain this morning. Patient 11 weeks pregnant. Denies vaginal bleeding. G3, P2.   Elliptical Excision Additional Text (Leave Blank If You Do Not Want): The margin was drawn around the clinically apparent lesion.  An elliptical shape was then drawn on the skin incorporating the lesion and margins.  Incisions were then made along these lines to the appropriate tissue plane and the lesion was extirpated.

## 2023-05-25 NOTE — ED PROVIDER NOTES
Encounter Date: 2023       History     Chief Complaint   Patient presents with    Fall     Kate Gomez is a 32 y.o. female with no significant PMH who is 11 weeks IUP who presents to the ED for evaluation of abdominal pain after fall.  Patient reports that last night she was trying to grab something underneath her bed while at home and she fell onto her buttocks.  She woke up this a.m. with lower abdominal pain that she describes as mild, constant, currently 4/10 in severity.  She denies alleviating or exacerbating factors.  She denies vaginal bleeding, leakage, or discharge.  She is followed by OBGYN, Dr. Isael Greenfield.     The history is provided by the patient.   Review of patient's allergies indicates:  No Known Allergies  History reviewed. No pertinent past medical history.  Past Surgical History:   Procedure Laterality Date     SECTION, LOW TRANSVERSE  2013     Family History   Problem Relation Age of Onset    Diabetes Maternal Grandmother     Cancer Maternal Aunt     Diabetes Paternal Grandmother     Breast cancer Neg Hx     Colon cancer Neg Hx     Ovarian cancer Neg Hx      Social History     Tobacco Use    Smoking status: Former     Types: Cigarettes     Quit date:      Years since quittin.3    Smokeless tobacco: Never    Tobacco comments:     told about smoking brochure received on admit-and told to ask doctor for meds and programs available if she decides she wants to stop smoking   Substance Use Topics    Alcohol use: Not Currently     Comment: Occasionally-not during pregnancy    Drug use: Not Currently     Types: Marijuana     Comment: Pt states she is quitting     Review of Systems   Constitutional:  Negative for fever.   HENT:  Negative for sore throat.    Respiratory:  Negative for chest tightness and shortness of breath.    Cardiovascular:  Negative for chest pain.   Gastrointestinal:  Positive for abdominal pain. Negative for nausea.   Genitourinary:  Negative for  dysuria, frequency and urgency.   Musculoskeletal:  Negative for back pain.   Skin:  Negative for rash.   Neurological:  Negative for dizziness, weakness, light-headedness and numbness.   Hematological:  Does not bruise/bleed easily.     Physical Exam     Initial Vitals [05/25/23 1025]   BP Pulse Resp Temp SpO2   127/87 107 18 97.9 °F (36.6 °C) 99 %      MAP       --         Physical Exam    Nursing note and vitals reviewed.  Constitutional: She appears well-developed and well-nourished.   HENT:   Head: Normocephalic and atraumatic.   Right Ear: Tympanic membrane, external ear and ear canal normal. Tympanic membrane is not erythematous. No middle ear effusion.   Left Ear: Tympanic membrane, external ear and ear canal normal. Tympanic membrane is not erythematous.  No middle ear effusion.   Nose: Nose normal.   Mouth/Throat: Uvula is midline, oropharynx is clear and moist and mucous membranes are normal. Mucous membranes are not pale and not dry.   Eyes: Conjunctivae and EOM are normal. Pupils are equal, round, and reactive to light.   Neck: Neck supple.   Normal range of motion.  Cardiovascular:  Normal rate, regular rhythm, normal heart sounds and intact distal pulses.           Pulmonary/Chest: Effort normal and breath sounds normal. She has no decreased breath sounds. She has no wheezes. She has no rhonchi. She has no rales.   Abdominal: Abdomen is soft. Bowel sounds are normal. There is no abdominal tenderness.   Musculoskeletal:         General: Normal range of motion.      Cervical back: Normal range of motion and neck supple.     Neurological: She is alert and oriented to person, place, and time. She has normal strength. She displays normal reflexes. No cranial nerve deficit or sensory deficit.   Skin: Skin is warm and dry. Capillary refill takes less than 2 seconds. No rash noted.   Psychiatric: She has a normal mood and affect. Her behavior is normal. Judgment and thought content normal.       ED Course    Procedures  Labs Reviewed - No data to display       Imaging Results              US OB <14 Wks, TransAbd, Single Gestation (Final result)  Result time 05/25/23 11:35:47      Final result by Shannna Jose MD (05/25/23 11:35:47)                   Impression:      Live intrauterine pregnancy with a fetal heart rate of 150 beats per minute.  Based upon crown-rump length, this corresponds to a 12 week 0 day gestation.      Electronically signed by: Shannan Jose MD  Date:    05/25/2023  Time:    11:35               Narrative:    EXAMINATION:  US OB <14 WEEKS TRANSABDOM, SINGLE GESTATION    CLINICAL HISTORY:  Unspecified fall, initial encounter    TECHNIQUE:  Transabdominal sonography of the pelvis was performed, followed by transvaginal sonography to better evaluate the uterus and ovaries.    COMPARISON:  None.    FINDINGS:  The uterus measures 12.9 x 7.7 x 8.9 cm.  There is an intrauterine gestational sac containing a fetus.  The crown-rump length measures 5.4 cm which corresponds to a 12 week 1 day gestation.  Fetal heart rate is 150 beats per minute.  The right ovary cannot be seen.  The left ovary has a normal appearance.  Small amount of fluid in the endocervical canal.                                       Medications - No data to display  Medical Decision Making:   Differential Diagnosis:   Placental abruption, subchorionic hemorrhage  Clinical Tests:   Radiological Study: Ordered and Reviewed  ED Management:  Evaluation of a 32-year-old female who is 11 weeks IUP who presents to the ED for evaluation of abdominal pain after fall.  Accidental fall yesterday while at home onto buttocks, woke up with lower abdominal pain today with concern for baby.  No abdominal ttp on exam.  US shows Live intrauterine pregnancy with a fetal heart rate of 150 beats per minute.  Based upon crown-rump length, this corresponds to a 12 week 0 day gestation.  Patient reassured that US looks good.   Will dc home with close  follow-up with OB/gyn. Patient/caregiver voices understanding and feels comfortable with discharge plan.      The patient acknowledges that close follow up with medical provider is required. Instructed to follow up with PCP within 2 days. Patient was given specific return precautions. The patient agrees to comply with all instruction and directions given in the ER.                          Clinical Impression:   Final diagnoses:  [W19.XXXA] Fall (Primary)  [R10.30] Lower abdominal pain  [Z3A.11] 11 weeks gestation of pregnancy        ED Disposition Condition    Discharge Stable          ED Prescriptions    None       Follow-up Information       Follow up With Specialties Details Why Contact Info Additional Information    [facility] Obstetrics and Gynecology Schedule an appointment as soon as possible for a visit in 2 days  [address] [facility] [id number] [phone] [facility]             Brigida Linn NP  05/25/23 1217

## 2023-05-26 ENCOUNTER — NURSE TRIAGE (OUTPATIENT)
Dept: ADMINISTRATIVE | Facility: CLINIC | Age: 33
End: 2023-05-26
Payer: MEDICAID

## 2023-05-26 NOTE — TELEPHONE ENCOUNTER
Reason for Disposition   MODERATE-SEVERE abdominal pain (e.g., interferes with normal activities, awakens from sleep)    Additional Information   Negative: Passed out (i.e., lost consciousness, collapsed and was not responding)   Negative: Shock suspected (e.g., cold/pale/clammy skin, too weak to stand, low BP, rapid pulse)   Negative: Difficult to awaken or acting confused (e.g., disoriented, slurred speech)   Negative: Sounds like a life-threatening emergency to the triager   Negative: Followed an abdomen (stomach) injury   Negative: [1] Abdominal pain AND [2] pregnant 20 or more weeks    Protocols used: Pregnancy - Abdominal Pain Less Than 20 Weeks EGA-A-AH  Preg 11 wk   In ED yest after fall off bed. woke with pain in stomach. pt states only things that help are aleve spray and muscle gel. Rating pain = varies from 5-8. No vag bleeding. fell on buttock. Rec ED now. also taking Tylenol. Pt states OB not aware of fall or pain. Pt states hard for her walk. Pt states she will go to local ED.

## 2023-05-26 NOTE — TELEPHONE ENCOUNTER
Pt 11 weeks pregnant.    Pt states that she had a fall on last night and fell on buttocks. Pt states that she started having abdominal pain and was seen at [place] ED. Pt asking if okay to use Aleve spray on abdomen while pregnant. Pt advised per protocol. Verbalized understanding. Encounter routed to provider.        Reason for Disposition   [1] Caller has medicine question about med NOT prescribed by PCP AND [2] triager unable to answer question (e.g., compatibility with other med, storage)    Additional Information   Negative: [1] Intentional drug overdose AND [2] suicidal thoughts or ideas   Negative: MORE THAN A DOUBLE DOSE of a prescription or over-the-counter (OTC) drug   Negative: [1] DOUBLE DOSE (an extra dose or lesser amount) of prescription drug AND [2] any symptoms (e.g., dizziness, nausea, pain, sleepiness)   Negative: [1] DOUBLE DOSE (an extra dose or lesser amount) of over-the-counter (OTC) drug AND [2] any symptoms (e.g., dizziness, nausea, pain, sleepiness)   Negative: Took another person's prescription drug   Negative: [1] DOUBLE DOSE (an extra dose or lesser amount) of prescription drug AND [2] NO symptoms  (Exception: A double dose of antibiotics.)   Negative: Diabetes drug error or overdose (e.g., took wrong type of insulin or took extra dose)   Negative: [1] Prescription not at pharmacy AND [2] was prescribed by PCP recently (Exception: Triager has access to EMR and prescription is recorded there. Go to Home Care and confirm for pharmacy.)   Negative: [1] Pharmacy calling with prescription question AND [2] triager unable to answer question   Negative: [1] Caller has URGENT medicine question about med that PCP or specialist prescribed AND [2] triager unable to answer question   Negative: Medicine patch causing local rash or itching    Protocols used: Medication Question Call-A-

## 2023-06-01 ENCOUNTER — ROUTINE PRENATAL (OUTPATIENT)
Dept: OBSTETRICS AND GYNECOLOGY | Facility: CLINIC | Age: 33
End: 2023-06-01
Payer: MEDICAID

## 2023-06-01 ENCOUNTER — LAB VISIT (OUTPATIENT)
Dept: LAB | Facility: HOSPITAL | Age: 33
End: 2023-06-01
Attending: OBSTETRICS & GYNECOLOGY
Payer: MEDICAID

## 2023-06-01 VITALS
SYSTOLIC BLOOD PRESSURE: 124 MMHG | DIASTOLIC BLOOD PRESSURE: 74 MMHG | BODY MASS INDEX: 46.41 KG/M2 | WEIGHT: 245.63 LBS | HEART RATE: 104 BPM

## 2023-06-01 DIAGNOSIS — Z34.82 ENCOUNTER FOR SUPERVISION OF OTHER NORMAL PREGNANCY IN SECOND TRIMESTER: ICD-10-CM

## 2023-06-01 DIAGNOSIS — Z98.891 PREVIOUS CESAREAN SECTION: ICD-10-CM

## 2023-06-01 DIAGNOSIS — Z3A.12 12 WEEKS GESTATION OF PREGNANCY: ICD-10-CM

## 2023-06-01 DIAGNOSIS — Z34.82 ENCOUNTER FOR SUPERVISION OF OTHER NORMAL PREGNANCY IN SECOND TRIMESTER: Primary | ICD-10-CM

## 2023-06-01 PROCEDURE — 99999 PR PBB SHADOW E&M-EST. PATIENT-LVL III: ICD-10-PCS | Mod: PBBFAC,,, | Performed by: OBSTETRICS & GYNECOLOGY

## 2023-06-01 PROCEDURE — 99213 PR OFFICE/OUTPT VISIT, EST, LEVL III, 20-29 MIN: ICD-10-PCS | Mod: TH,S$PBB,, | Performed by: OBSTETRICS & GYNECOLOGY

## 2023-06-01 PROCEDURE — 36415 COLL VENOUS BLD VENIPUNCTURE: CPT | Performed by: OBSTETRICS & GYNECOLOGY

## 2023-06-01 PROCEDURE — 99213 OFFICE O/P EST LOW 20 MIN: CPT | Mod: PBBFAC,TH | Performed by: OBSTETRICS & GYNECOLOGY

## 2023-06-01 PROCEDURE — 99999 PR PBB SHADOW E&M-EST. PATIENT-LVL III: CPT | Mod: PBBFAC,,, | Performed by: OBSTETRICS & GYNECOLOGY

## 2023-06-01 PROCEDURE — 99213 OFFICE O/P EST LOW 20 MIN: CPT | Mod: TH,S$PBB,, | Performed by: OBSTETRICS & GYNECOLOGY

## 2023-06-01 NOTE — PROGRESS NOTES
Patient doing well. No vaginal bleeding or cramping noted. Discussed the need for an anatomy scan between 18-20 weeks. Discussed genetic testing/screening; Materna T21 ordered. RTC in 4 weeks.    Vitals signs, FHTs, urine dip, and PE findings documented, reviewed and available in OB flow chart.       I spent a total of 20 minutes on the day of the visit.This includes face to face time and non-face to face time preparing to see the patient (eg, review of tests), Obtaining and/or reviewing separately obtained history, Documenting clinical information in the electronic or other health record, Independently interpreting resultsand communicating results to the patient/family/caregiver, or Care coordination.      Coffective counseling sheet Get Ready discussed with mother. Reinforced avoiding induction of labor unless medically indicated as well as comfort measures during labor.  Encouraged mother to download Coffective mobile ramsey if she has not already done so. Mother verbalizes understanding.

## 2023-06-04 ENCOUNTER — PATIENT MESSAGE (OUTPATIENT)
Dept: ADMINISTRATIVE | Facility: OTHER | Age: 33
End: 2023-06-04
Payer: MEDICAID

## 2023-06-08 ENCOUNTER — PATIENT MESSAGE (OUTPATIENT)
Dept: OBSTETRICS AND GYNECOLOGY | Facility: CLINIC | Age: 33
End: 2023-06-08
Payer: MEDICAID

## 2023-06-25 ENCOUNTER — PATIENT MESSAGE (OUTPATIENT)
Dept: OTHER | Facility: OTHER | Age: 33
End: 2023-06-25
Payer: MEDICAID

## 2023-06-27 ENCOUNTER — ROUTINE PRENATAL (OUTPATIENT)
Dept: OBSTETRICS AND GYNECOLOGY | Facility: CLINIC | Age: 33
End: 2023-06-27
Payer: MEDICAID

## 2023-06-27 VITALS
SYSTOLIC BLOOD PRESSURE: 122 MMHG | HEART RATE: 99 BPM | DIASTOLIC BLOOD PRESSURE: 72 MMHG | BODY MASS INDEX: 46.48 KG/M2 | WEIGHT: 246 LBS

## 2023-06-27 DIAGNOSIS — Z3A.16 16 WEEKS GESTATION OF PREGNANCY: ICD-10-CM

## 2023-06-27 DIAGNOSIS — Z34.82 ENCOUNTER FOR SUPERVISION OF OTHER NORMAL PREGNANCY IN SECOND TRIMESTER: Primary | ICD-10-CM

## 2023-06-27 DIAGNOSIS — Z98.891 PREVIOUS CESAREAN SECTION: ICD-10-CM

## 2023-06-27 LAB
BILIRUB SERPL-MCNC: NEGATIVE MG/DL
BLOOD URINE, POC: ABNORMAL
CLARITY, POC UA: ABNORMAL
COLOR, POC UA: YELLOW
GLUCOSE UR QL STRIP: NEGATIVE
KETONES UR QL STRIP: ABNORMAL
LEUKOCYTE ESTERASE URINE, POC: NEGATIVE
NITRITE, POC UA: POSITIVE
PH, POC UA: 5
PROTEIN, POC: ABNORMAL
SPECIFIC GRAVITY, POC UA: 1.02
UROBILINOGEN, POC UA: NEGATIVE

## 2023-06-27 PROCEDURE — 99213 PR OFFICE/OUTPT VISIT, EST, LEVL III, 20-29 MIN: ICD-10-PCS | Mod: TH,S$PBB,, | Performed by: OBSTETRICS & GYNECOLOGY

## 2023-06-27 PROCEDURE — 99213 OFFICE O/P EST LOW 20 MIN: CPT | Mod: TH,S$PBB,, | Performed by: OBSTETRICS & GYNECOLOGY

## 2023-06-27 PROCEDURE — 99999 PR PBB SHADOW E&M-EST. PATIENT-LVL II: CPT | Mod: PBBFAC,,, | Performed by: OBSTETRICS & GYNECOLOGY

## 2023-06-27 PROCEDURE — 99212 OFFICE O/P EST SF 10 MIN: CPT | Mod: PBBFAC,TH | Performed by: OBSTETRICS & GYNECOLOGY

## 2023-06-27 PROCEDURE — 81002 URINALYSIS NONAUTO W/O SCOPE: CPT | Mod: PBBFAC | Performed by: OBSTETRICS & GYNECOLOGY

## 2023-06-27 PROCEDURE — 99999 PR PBB SHADOW E&M-EST. PATIENT-LVL II: ICD-10-PCS | Mod: PBBFAC,,, | Performed by: OBSTETRICS & GYNECOLOGY

## 2023-06-27 RX ORDER — ASPIRIN 81 MG/1
81 TABLET ORAL DAILY
Refills: 0 | COMMUNITY
Start: 2023-06-27 | End: 2024-04-02

## 2023-06-27 RX ORDER — NITROFURANTOIN 25; 75 MG/1; MG/1
100 CAPSULE ORAL 2 TIMES DAILY
Qty: 14 CAPSULE | Refills: 0 | Status: SHIPPED | OUTPATIENT
Start: 2023-06-27 | End: 2023-07-31

## 2023-06-27 NOTE — PROGRESS NOTES
Patient with no complaints. Denies vaginal bleeding or cramping. . Anatomy scan ordered. RTC in 4 weeks    Vitals signs, FHTs, urine dip, and PE findings documented, reviewed and available in OB flow chart.       I spent a total of 20 minutes on the day of the visit.This includes face to face time and non-face to face time preparing to see the patient (eg, review of tests), Obtaining and/or reviewing separately obtained history, Documenting clinical information in the electronic or other health record, Independently interpreting resultsand communicating results to the patient/family/caregiver, or Care coordination.     Coffective counseling sheet Fall In Love discussed with mother. Reinforced immediate skin to skin, the magic first hour, importance of the first feeding and delaying routine procedures. Encouraged mother to download Coffective mobile ramsey if she has not already done so. Mother verbalizes understanding.

## 2023-06-28 ENCOUNTER — PATIENT MESSAGE (OUTPATIENT)
Dept: OBSTETRICS AND GYNECOLOGY | Facility: CLINIC | Age: 33
End: 2023-06-28
Payer: MEDICAID

## 2023-07-02 ENCOUNTER — PATIENT MESSAGE (OUTPATIENT)
Dept: OTHER | Facility: OTHER | Age: 33
End: 2023-07-02
Payer: MEDICAID

## 2023-07-13 ENCOUNTER — PATIENT MESSAGE (OUTPATIENT)
Dept: OBSTETRICS AND GYNECOLOGY | Facility: CLINIC | Age: 33
End: 2023-07-13
Payer: MEDICAID

## 2023-07-23 ENCOUNTER — PATIENT MESSAGE (OUTPATIENT)
Dept: OTHER | Facility: OTHER | Age: 33
End: 2023-07-23
Payer: MEDICAID

## 2023-07-31 ENCOUNTER — ROUTINE PRENATAL (OUTPATIENT)
Dept: OBSTETRICS AND GYNECOLOGY | Facility: CLINIC | Age: 33
End: 2023-07-31
Payer: MEDICAID

## 2023-07-31 VITALS
WEIGHT: 244.81 LBS | BODY MASS INDEX: 46.25 KG/M2 | HEART RATE: 106 BPM | SYSTOLIC BLOOD PRESSURE: 120 MMHG | DIASTOLIC BLOOD PRESSURE: 72 MMHG

## 2023-07-31 DIAGNOSIS — Z34.82 ENCOUNTER FOR SUPERVISION OF OTHER NORMAL PREGNANCY IN SECOND TRIMESTER: Primary | ICD-10-CM

## 2023-07-31 DIAGNOSIS — Z3A.21 21 WEEKS GESTATION OF PREGNANCY: ICD-10-CM

## 2023-07-31 DIAGNOSIS — F12.90 MARIJUANA USE: ICD-10-CM

## 2023-07-31 DIAGNOSIS — Z98.891 PREVIOUS CESAREAN SECTION: ICD-10-CM

## 2023-07-31 PROCEDURE — 99999 PR PBB SHADOW E&M-EST. PATIENT-LVL II: CPT | Mod: PBBFAC,,, | Performed by: OBSTETRICS & GYNECOLOGY

## 2023-07-31 PROCEDURE — 99999 PR PBB SHADOW E&M-EST. PATIENT-LVL II: ICD-10-PCS | Mod: PBBFAC,,, | Performed by: OBSTETRICS & GYNECOLOGY

## 2023-07-31 PROCEDURE — 99212 OFFICE O/P EST SF 10 MIN: CPT | Mod: PBBFAC,TH | Performed by: OBSTETRICS & GYNECOLOGY

## 2023-07-31 PROCEDURE — 99213 OFFICE O/P EST LOW 20 MIN: CPT | Mod: TH,S$PBB,, | Performed by: OBSTETRICS & GYNECOLOGY

## 2023-07-31 PROCEDURE — 99213 PR OFFICE/OUTPT VISIT, EST, LEVL III, 20-29 MIN: ICD-10-PCS | Mod: TH,S$PBB,, | Performed by: OBSTETRICS & GYNECOLOGY

## 2023-07-31 NOTE — PROGRESS NOTES
Patient with no complaints. Denies vaginal bleeding or cramping.  Good FM. Discussed with patient having glucose testing for gestational diabetes preformed between 24-28 weeks. RTC in 4 weeks.     Vitals signs, FHTs, urine dip, and PE findings documented, reviewed and available in OB flow chart.       I spent a total of 20 minutes on the day of the visit.This includes face to face time and non-face to face time preparing to see the patient (eg, review of tests), Obtaining and/or reviewing separately obtained history, Documenting clinical information in the electronic or other health record, Independently interpreting resultsand communicating results to the patient/family/caregiver, or Care coordination.     Coffective counseling sheet Learn Your Baby and Protect Breastfeeding discussed with mother. Instructed regarding feeding cues and methods to calm baby. Encouraged mother to download Coffective mobile ramsey if she has not already done so.  Mother verbalized understanding.

## 2023-08-10 ENCOUNTER — PROCEDURE VISIT (OUTPATIENT)
Dept: OBSTETRICS AND GYNECOLOGY | Facility: CLINIC | Age: 33
End: 2023-08-10
Payer: MEDICAID

## 2023-08-10 DIAGNOSIS — Z34.82 ENCOUNTER FOR SUPERVISION OF OTHER NORMAL PREGNANCY IN SECOND TRIMESTER: ICD-10-CM

## 2023-08-10 PROCEDURE — 76811 US OB/GYN EXTENDED PROCEDURE (VIEWPOINT): ICD-10-PCS | Mod: 26,S$PBB,, | Performed by: OBSTETRICS & GYNECOLOGY

## 2023-08-10 PROCEDURE — 76811 OB US DETAILED SNGL FETUS: CPT | Mod: PBBFAC | Performed by: OBSTETRICS & GYNECOLOGY

## 2023-08-20 ENCOUNTER — PATIENT MESSAGE (OUTPATIENT)
Dept: OTHER | Facility: OTHER | Age: 33
End: 2023-08-20
Payer: MEDICAID

## 2023-08-28 ENCOUNTER — ROUTINE PRENATAL (OUTPATIENT)
Dept: OBSTETRICS AND GYNECOLOGY | Facility: CLINIC | Age: 33
End: 2023-08-28
Payer: MEDICAID

## 2023-08-28 VITALS
WEIGHT: 241.63 LBS | DIASTOLIC BLOOD PRESSURE: 70 MMHG | HEART RATE: 99 BPM | BODY MASS INDEX: 45.65 KG/M2 | SYSTOLIC BLOOD PRESSURE: 128 MMHG

## 2023-08-28 DIAGNOSIS — Z3A.25 25 WEEKS GESTATION OF PREGNANCY: ICD-10-CM

## 2023-08-28 DIAGNOSIS — Z98.891 PREVIOUS CESAREAN SECTION: ICD-10-CM

## 2023-08-28 DIAGNOSIS — Z34.82 ENCOUNTER FOR SUPERVISION OF OTHER NORMAL PREGNANCY IN SECOND TRIMESTER: Primary | ICD-10-CM

## 2023-08-28 PROCEDURE — 99999 PR PBB SHADOW E&M-EST. PATIENT-LVL III: CPT | Mod: PBBFAC,,, | Performed by: OBSTETRICS & GYNECOLOGY

## 2023-08-28 PROCEDURE — 99213 OFFICE O/P EST LOW 20 MIN: CPT | Mod: PBBFAC,TH | Performed by: OBSTETRICS & GYNECOLOGY

## 2023-08-28 PROCEDURE — 99213 OFFICE O/P EST LOW 20 MIN: CPT | Mod: TH,S$PBB,, | Performed by: OBSTETRICS & GYNECOLOGY

## 2023-08-28 PROCEDURE — 99999 PR PBB SHADOW E&M-EST. PATIENT-LVL III: ICD-10-PCS | Mod: PBBFAC,,, | Performed by: OBSTETRICS & GYNECOLOGY

## 2023-08-28 PROCEDURE — 99213 PR OFFICE/OUTPT VISIT, EST, LEVL III, 20-29 MIN: ICD-10-PCS | Mod: TH,S$PBB,, | Performed by: OBSTETRICS & GYNECOLOGY

## 2023-08-28 NOTE — PROGRESS NOTES
Patient with no complaints. Denies vaginal bleeding or contractions. Good FM. GTT/CBC ordered today.   labor precautions discussed with patient. RTC in 2 weeks.     Vitals signs, FHTs, urine dip, and PE findings documented, reviewed and available in OB flow chart.       I spent a total of 20 minutes on the day of the visit.This includes face to face time and non-face to face time preparing to see the patient (eg, review of tests), Obtaining and/or reviewing separately obtained history, Documenting clinical information in the electronic or other health record, Independently interpreting resultsand communicating results to the patient/family/caregiver, or Care coordination.     Coffective counseling sheet Nourish discussed with mother. Reinforced basic breastfeeding position and latch as well as proper hand expression technique and avoidance of artificial nipples and formula unless medically indicated. Encouraged mother to download Coffective mobile ramsey if she has not already done so.  Mother verbalizes understanding.

## 2023-09-03 ENCOUNTER — PATIENT MESSAGE (OUTPATIENT)
Dept: OTHER | Facility: OTHER | Age: 33
End: 2023-09-03
Payer: MEDICAID

## 2023-09-06 ENCOUNTER — PATIENT MESSAGE (OUTPATIENT)
Dept: OBSTETRICS AND GYNECOLOGY | Facility: CLINIC | Age: 33
End: 2023-09-06
Payer: MEDICAID

## 2023-09-07 ENCOUNTER — PATIENT MESSAGE (OUTPATIENT)
Dept: OBSTETRICS AND GYNECOLOGY | Facility: CLINIC | Age: 33
End: 2023-09-07
Payer: MEDICAID

## 2023-09-07 RX ORDER — FLUCONAZOLE 150 MG/1
150 TABLET ORAL ONCE
Qty: 1 TABLET | Refills: 0 | Status: SHIPPED | OUTPATIENT
Start: 2023-09-07 | End: 2023-09-07

## 2023-09-07 NOTE — TELEPHONE ENCOUNTER
Pt states she has tried everything for an external vaginal itch and nothing is helping her and its driving her nuts, pt is wanting to know if there's anything besides vagasil and cortisone cream that she can use to stop the itching. Please advise.

## 2023-09-17 ENCOUNTER — PATIENT MESSAGE (OUTPATIENT)
Dept: OTHER | Facility: OTHER | Age: 33
End: 2023-09-17
Payer: MEDICAID

## 2023-09-18 ENCOUNTER — PATIENT MESSAGE (OUTPATIENT)
Dept: OBSTETRICS AND GYNECOLOGY | Facility: CLINIC | Age: 33
End: 2023-09-18
Payer: MEDICAID

## 2023-09-19 ENCOUNTER — PATIENT MESSAGE (OUTPATIENT)
Dept: OBSTETRICS AND GYNECOLOGY | Facility: CLINIC | Age: 33
End: 2023-09-19
Payer: MEDICAID

## 2023-09-20 NOTE — TELEPHONE ENCOUNTER
I spoke to the pt on the phone and informed her that the maximum strength Mucinex is fine to take, pt v/u.

## 2023-09-25 ENCOUNTER — TELEPHONE (OUTPATIENT)
Dept: OBSTETRICS AND GYNECOLOGY | Facility: CLINIC | Age: 33
End: 2023-09-25

## 2023-09-25 ENCOUNTER — LAB VISIT (OUTPATIENT)
Dept: LAB | Facility: HOSPITAL | Age: 33
End: 2023-09-25
Attending: OBSTETRICS & GYNECOLOGY
Payer: MEDICAID

## 2023-09-25 ENCOUNTER — ROUTINE PRENATAL (OUTPATIENT)
Dept: OBSTETRICS AND GYNECOLOGY | Facility: CLINIC | Age: 33
End: 2023-09-25
Payer: MEDICAID

## 2023-09-25 VITALS
DIASTOLIC BLOOD PRESSURE: 74 MMHG | HEART RATE: 98 BPM | WEIGHT: 241.06 LBS | BODY MASS INDEX: 45.55 KG/M2 | SYSTOLIC BLOOD PRESSURE: 124 MMHG

## 2023-09-25 DIAGNOSIS — R30.0 DYSURIA: ICD-10-CM

## 2023-09-25 DIAGNOSIS — Z3A.29 29 WEEKS GESTATION OF PREGNANCY: ICD-10-CM

## 2023-09-25 DIAGNOSIS — Z23 NEED FOR TDAP VACCINATION: ICD-10-CM

## 2023-09-25 DIAGNOSIS — Z34.83 ENCOUNTER FOR SUPERVISION OF OTHER NORMAL PREGNANCY IN THIRD TRIMESTER: ICD-10-CM

## 2023-09-25 DIAGNOSIS — R73.09 ABNORMAL GTT (GLUCOSE TOLERANCE TEST): Primary | ICD-10-CM

## 2023-09-25 DIAGNOSIS — Z98.891 PREVIOUS CESAREAN SECTION: ICD-10-CM

## 2023-09-25 DIAGNOSIS — Z34.83 ENCOUNTER FOR SUPERVISION OF OTHER NORMAL PREGNANCY IN THIRD TRIMESTER: Primary | ICD-10-CM

## 2023-09-25 LAB
BASOPHILS # BLD AUTO: 0.01 K/UL (ref 0–0.2)
BASOPHILS NFR BLD: 0.2 % (ref 0–1.9)
BILIRUB SERPL-MCNC: NEGATIVE MG/DL
BLOOD URINE, POC: ABNORMAL
CLARITY, POC UA: CLEAR
COLOR, POC UA: YELLOW
DIFFERENTIAL METHOD: ABNORMAL
EOSINOPHIL # BLD AUTO: 0.1 K/UL (ref 0–0.5)
EOSINOPHIL NFR BLD: 0.9 % (ref 0–8)
ERYTHROCYTE [DISTWIDTH] IN BLOOD BY AUTOMATED COUNT: 16.3 % (ref 11.5–14.5)
GLUCOSE SERPL-MCNC: 174 MG/DL (ref 70–140)
GLUCOSE UR QL STRIP: NEGATIVE
HCT VFR BLD AUTO: 34 % (ref 37–48.5)
HGB BLD-MCNC: 10.4 G/DL (ref 12–16)
IMM GRANULOCYTES # BLD AUTO: 0.06 K/UL (ref 0–0.04)
IMM GRANULOCYTES NFR BLD AUTO: 0.9 % (ref 0–0.5)
KETONES UR QL STRIP: 40
LEUKOCYTE ESTERASE URINE, POC: ABNORMAL
LYMPHOCYTES # BLD AUTO: 1.1 K/UL (ref 1–4.8)
LYMPHOCYTES NFR BLD: 17.5 % (ref 18–48)
MCH RBC QN AUTO: 23.7 PG (ref 27–31)
MCHC RBC AUTO-ENTMCNC: 30.6 G/DL (ref 32–36)
MCV RBC AUTO: 77 FL (ref 82–98)
MONOCYTES # BLD AUTO: 0.3 K/UL (ref 0.3–1)
MONOCYTES NFR BLD: 4 % (ref 4–15)
NEUTROPHILS # BLD AUTO: 5 K/UL (ref 1.8–7.7)
NEUTROPHILS NFR BLD: 76.5 % (ref 38–73)
NITRITE, POC UA: NEGATIVE
NRBC BLD-RTO: 0 /100 WBC
PH, POC UA: 6
PLATELET # BLD AUTO: 282 K/UL (ref 150–450)
PMV BLD AUTO: 10 FL (ref 9.2–12.9)
PROTEIN, POC: NEGATIVE
RBC # BLD AUTO: 4.39 M/UL (ref 4–5.4)
SPECIFIC GRAVITY, POC UA: 1.03
UROBILINOGEN, POC UA: 0.2
WBC # BLD AUTO: 6.53 K/UL (ref 3.9–12.7)

## 2023-09-25 PROCEDURE — 81002 URINALYSIS NONAUTO W/O SCOPE: CPT | Mod: PBBFAC | Performed by: OBSTETRICS & GYNECOLOGY

## 2023-09-25 PROCEDURE — 99999PBSHW TDAP VACCINE GREATER THAN OR EQUAL TO 7YO IM: Mod: PBBFAC,,,

## 2023-09-25 PROCEDURE — 99213 OFFICE O/P EST LOW 20 MIN: CPT | Mod: 25,TH,S$PBB, | Performed by: OBSTETRICS & GYNECOLOGY

## 2023-09-25 PROCEDURE — 82950 GLUCOSE TEST: CPT | Performed by: OBSTETRICS & GYNECOLOGY

## 2023-09-25 PROCEDURE — 99999PBSHW TDAP VACCINE GREATER THAN OR EQUAL TO 7YO IM: ICD-10-PCS | Mod: PBBFAC,,,

## 2023-09-25 PROCEDURE — 99213 PR OFFICE/OUTPT VISIT, EST, LEVL III, 20-29 MIN: ICD-10-PCS | Mod: 25,TH,S$PBB, | Performed by: OBSTETRICS & GYNECOLOGY

## 2023-09-25 PROCEDURE — 90715 TDAP VACCINE 7 YRS/> IM: CPT | Mod: PBBFAC

## 2023-09-25 PROCEDURE — 85025 COMPLETE CBC W/AUTO DIFF WBC: CPT | Performed by: OBSTETRICS & GYNECOLOGY

## 2023-09-25 PROCEDURE — 87077 CULTURE AEROBIC IDENTIFY: CPT | Performed by: OBSTETRICS & GYNECOLOGY

## 2023-09-25 PROCEDURE — 87186 SC STD MICRODIL/AGAR DIL: CPT | Performed by: OBSTETRICS & GYNECOLOGY

## 2023-09-25 PROCEDURE — 99999 PR PBB SHADOW E&M-EST. PATIENT-LVL III: CPT | Mod: PBBFAC,,, | Performed by: OBSTETRICS & GYNECOLOGY

## 2023-09-25 PROCEDURE — 36415 COLL VENOUS BLD VENIPUNCTURE: CPT | Performed by: OBSTETRICS & GYNECOLOGY

## 2023-09-25 PROCEDURE — 87086 URINE CULTURE/COLONY COUNT: CPT | Performed by: OBSTETRICS & GYNECOLOGY

## 2023-09-25 PROCEDURE — 87088 URINE BACTERIA CULTURE: CPT | Performed by: OBSTETRICS & GYNECOLOGY

## 2023-09-25 PROCEDURE — 99999 PR PBB SHADOW E&M-EST. PATIENT-LVL III: ICD-10-PCS | Mod: PBBFAC,,, | Performed by: OBSTETRICS & GYNECOLOGY

## 2023-09-25 PROCEDURE — 90471 IMMUNIZATION ADMIN: CPT | Mod: PBBFAC

## 2023-09-25 PROCEDURE — 99213 OFFICE O/P EST LOW 20 MIN: CPT | Mod: PBBFAC,TH | Performed by: OBSTETRICS & GYNECOLOGY

## 2023-09-25 PROCEDURE — 99999PBSHW POCT URINE DIPSTICK WITHOUT MICROSCOPE: Mod: PBBFAC,,,

## 2023-09-25 RX ORDER — GUAIFENESIN 600 MG/1
1200 TABLET, EXTENDED RELEASE ORAL 2 TIMES DAILY
COMMUNITY
End: 2023-11-10

## 2023-09-25 NOTE — TELEPHONE ENCOUNTER
RCS scheduled for 12/4/2023 with pre-op, pre-admit and post op appointments scheduled and discussed with patient.  Pt verbalized understanding.  Case request number [id number].  Shannan in surgery department notified of date and time.

## 2023-09-25 NOTE — PROGRESS NOTES
Patient with no complaints. Denies vaginal bleeding or contractions. Good FM. Growth scan ordered for next visit.     Vitals signs, FHTs, urine dip, and PE findings documented, reviewed and available in OB flow chart.       I spent a total of 20 minutes on the day of the visit.This includes face to face time and non-face to face time preparing to see the patient (eg, review of tests), Obtaining and/or reviewing separately obtained history, Documenting clinical information in the electronic or other health record, Independently interpreting resultsand communicating results to the patient/family/caregiver, or Care coordination.     Coffective Motivation Document discussed with mother. Reinforced benefits of breastfeeding, risk of formula, and cue based feedings. Encouraged mother to download [employer] mobile ramsey if she has not already done so. Mother verbalizes understanding.

## 2023-09-27 ENCOUNTER — PATIENT MESSAGE (OUTPATIENT)
Dept: OBSTETRICS AND GYNECOLOGY | Facility: CLINIC | Age: 33
End: 2023-09-27
Payer: MEDICAID

## 2023-09-28 LAB — BACTERIA UR CULT: ABNORMAL

## 2023-09-28 RX ORDER — SULFAMETHOXAZOLE AND TRIMETHOPRIM 800; 160 MG/1; MG/1
1 TABLET ORAL 2 TIMES DAILY
Qty: 14 TABLET | Refills: 0 | Status: SHIPPED | OUTPATIENT
Start: 2023-09-28 | End: 2023-10-17

## 2023-09-28 NOTE — PROGRESS NOTES
Patient's urine culture is sensitive to Bactrim.  Patient may safely take Bactrim at this point in her pregnancy

## 2023-09-29 ENCOUNTER — LAB VISIT (OUTPATIENT)
Dept: LAB | Facility: HOSPITAL | Age: 33
End: 2023-09-29
Attending: OBSTETRICS & GYNECOLOGY
Payer: MEDICAID

## 2023-09-29 DIAGNOSIS — R73.09 ABNORMAL GTT (GLUCOSE TOLERANCE TEST): ICD-10-CM

## 2023-09-29 LAB
GLUCOSE SERPL-MCNC: 138 MG/DL
GLUCOSE SERPL-MCNC: 185 MG/DL
GLUCOSE SERPL-MCNC: 84 MG/DL
GLUCOSE SERPL-MCNC: 95 MG/DL (ref 70–110)

## 2023-09-29 PROCEDURE — 82952 GTT-ADDED SAMPLES: CPT | Performed by: OBSTETRICS & GYNECOLOGY

## 2023-09-29 PROCEDURE — 36415 COLL VENOUS BLD VENIPUNCTURE: CPT | Performed by: OBSTETRICS & GYNECOLOGY

## 2023-09-29 PROCEDURE — 82951 GLUCOSE TOLERANCE TEST (GTT): CPT | Performed by: OBSTETRICS & GYNECOLOGY

## 2023-10-01 ENCOUNTER — PATIENT MESSAGE (OUTPATIENT)
Dept: OTHER | Facility: OTHER | Age: 33
End: 2023-10-01
Payer: MEDICAID

## 2023-10-15 ENCOUNTER — PATIENT MESSAGE (OUTPATIENT)
Dept: OTHER | Facility: OTHER | Age: 33
End: 2023-10-15
Payer: MEDICAID

## 2023-10-17 ENCOUNTER — PROCEDURE VISIT (OUTPATIENT)
Dept: OBSTETRICS AND GYNECOLOGY | Facility: CLINIC | Age: 33
End: 2023-10-17
Payer: MEDICAID

## 2023-10-17 ENCOUNTER — ROUTINE PRENATAL (OUTPATIENT)
Dept: OBSTETRICS AND GYNECOLOGY | Facility: CLINIC | Age: 33
End: 2023-10-17
Payer: MEDICAID

## 2023-10-17 VITALS
DIASTOLIC BLOOD PRESSURE: 70 MMHG | BODY MASS INDEX: 45.68 KG/M2 | HEART RATE: 78 BPM | SYSTOLIC BLOOD PRESSURE: 122 MMHG | WEIGHT: 241.75 LBS

## 2023-10-17 DIAGNOSIS — Z98.891 PREVIOUS CESAREAN SECTION: ICD-10-CM

## 2023-10-17 DIAGNOSIS — F12.90 MARIJUANA USE: ICD-10-CM

## 2023-10-17 DIAGNOSIS — Z34.83 ENCOUNTER FOR SUPERVISION OF OTHER NORMAL PREGNANCY IN THIRD TRIMESTER: ICD-10-CM

## 2023-10-17 DIAGNOSIS — Z34.83 ENCOUNTER FOR SUPERVISION OF OTHER NORMAL PREGNANCY IN THIRD TRIMESTER: Primary | ICD-10-CM

## 2023-10-17 DIAGNOSIS — B37.2 SKIN YEAST INFECTION: ICD-10-CM

## 2023-10-17 DIAGNOSIS — N89.8 VAGINAL IRRITATION: ICD-10-CM

## 2023-10-17 PROCEDURE — 81514 NFCT DS BV&VAGINITIS DNA ALG: CPT | Performed by: OBSTETRICS & GYNECOLOGY

## 2023-10-17 PROCEDURE — 76816 US OB/GYN EXTENDED PROCEDURE (VIEWPOINT): ICD-10-PCS | Mod: 26,S$PBB,, | Performed by: OBSTETRICS & GYNECOLOGY

## 2023-10-17 PROCEDURE — 99213 OFFICE O/P EST LOW 20 MIN: CPT | Mod: PBBFAC,TH | Performed by: OBSTETRICS & GYNECOLOGY

## 2023-10-17 PROCEDURE — 99999 PR PBB SHADOW E&M-EST. PATIENT-LVL III: CPT | Mod: PBBFAC,,, | Performed by: OBSTETRICS & GYNECOLOGY

## 2023-10-17 PROCEDURE — 99213 OFFICE O/P EST LOW 20 MIN: CPT | Mod: TH,S$PBB,, | Performed by: OBSTETRICS & GYNECOLOGY

## 2023-10-17 PROCEDURE — 99999 PR PBB SHADOW E&M-EST. PATIENT-LVL III: ICD-10-PCS | Mod: PBBFAC,,, | Performed by: OBSTETRICS & GYNECOLOGY

## 2023-10-17 PROCEDURE — 99213 PR OFFICE/OUTPT VISIT, EST, LEVL III, 20-29 MIN: ICD-10-PCS | Mod: TH,S$PBB,, | Performed by: OBSTETRICS & GYNECOLOGY

## 2023-10-17 PROCEDURE — 76816 OB US FOLLOW-UP PER FETUS: CPT | Mod: PBBFAC | Performed by: OBSTETRICS & GYNECOLOGY

## 2023-10-17 RX ORDER — CLOTRIMAZOLE AND BETAMETHASONE DIPROPIONATE 10; .64 MG/G; MG/G
CREAM TOPICAL 2 TIMES DAILY
Qty: 45 G | Refills: 2 | Status: SHIPPED | OUTPATIENT
Start: 2023-10-17 | End: 2023-11-10

## 2023-10-17 NOTE — PROGRESS NOTES
Patient with no complaints. Denies vaginal bleeding or contractions. Good FM. Growth scan reviewed. RTC in 2 weeks.     Vitals signs, FHTs, urine dip, and PE findings documented, reviewed and available in OB flow chart.       I spent a total of 20 minutes on the day of the visit.This includes face to face time and non-face to face time preparing to see the patient (eg, review of tests), Obtaining and/or reviewing separately obtained history, Documenting clinical information in the electronic or other health record, Independently interpreting resultsand communicating results to the patient/family/caregiver, or Care coordination.     Coffective counseling sheet Build Your Team discussed with mother. Reinforced importance of early identification of support team including champion, OB provider, pediatrician and local community resources. Encouraged mother to download Coffective mobile ramsey if she has not already done so.  Mother verbalizes understanding. Also discussed quiet time and delayed bathing.

## 2023-10-18 LAB
BACTERIAL VAGINOSIS DNA: NEGATIVE
CANDIDA GLABRATA DNA: NEGATIVE
CANDIDA KRUSEI DNA: NEGATIVE
CANDIDA RRNA VAG QL PROBE: POSITIVE
T VAGINALIS RRNA GENITAL QL PROBE: NEGATIVE

## 2023-10-19 RX ORDER — TERCONAZOLE 8 MG/G
1 CREAM VAGINAL DAILY
Qty: 20 G | Refills: 1 | Status: SHIPPED | OUTPATIENT
Start: 2023-10-19 | End: 2023-11-10

## 2023-10-19 NOTE — PROGRESS NOTES
Patient's recent culture was positive for yeast.  A prescription for treatment has been sent to her pharmacy

## 2023-10-25 ENCOUNTER — TELEPHONE (OUTPATIENT)
Dept: OBSTETRICS AND GYNECOLOGY | Facility: CLINIC | Age: 33
End: 2023-10-25
Payer: MEDICAID

## 2023-10-25 ENCOUNTER — PATIENT MESSAGE (OUTPATIENT)
Dept: OBSTETRICS AND GYNECOLOGY | Facility: CLINIC | Age: 33
End: 2023-10-25
Payer: MEDICAID

## 2023-10-25 NOTE — TELEPHONE ENCOUNTER
I spoke with the pt due to her needing proof of pregnancy faxed over to medicaid. I faxed the form to [phone] with her case # [id number].

## 2023-11-05 ENCOUNTER — PATIENT MESSAGE (OUTPATIENT)
Dept: OTHER | Facility: OTHER | Age: 33
End: 2023-11-05
Payer: MEDICAID

## 2023-11-10 ENCOUNTER — ROUTINE PRENATAL (OUTPATIENT)
Dept: OBSTETRICS AND GYNECOLOGY | Facility: CLINIC | Age: 33
End: 2023-11-10
Payer: MEDICAID

## 2023-11-10 VITALS
WEIGHT: 240.44 LBS | HEART RATE: 84 BPM | BODY MASS INDEX: 45.43 KG/M2 | DIASTOLIC BLOOD PRESSURE: 64 MMHG | SYSTOLIC BLOOD PRESSURE: 120 MMHG

## 2023-11-10 DIAGNOSIS — Z34.83 ENCOUNTER FOR SUPERVISION OF OTHER NORMAL PREGNANCY IN THIRD TRIMESTER: Primary | ICD-10-CM

## 2023-11-10 DIAGNOSIS — Z98.891 PREVIOUS CESAREAN SECTION: ICD-10-CM

## 2023-11-10 DIAGNOSIS — Z3A.35 35 WEEKS GESTATION OF PREGNANCY: ICD-10-CM

## 2023-11-10 DIAGNOSIS — Z36.85 ANTENATAL SCREENING FOR STREPTOCOCCUS B: ICD-10-CM

## 2023-11-10 PROCEDURE — 99213 PR OFFICE/OUTPT VISIT, EST, LEVL III, 20-29 MIN: ICD-10-PCS | Mod: TH,S$PBB,, | Performed by: OBSTETRICS & GYNECOLOGY

## 2023-11-10 PROCEDURE — 99999 PR PBB SHADOW E&M-EST. PATIENT-LVL III: CPT | Mod: PBBFAC,,, | Performed by: OBSTETRICS & GYNECOLOGY

## 2023-11-10 PROCEDURE — 87081 CULTURE SCREEN ONLY: CPT | Performed by: OBSTETRICS & GYNECOLOGY

## 2023-11-10 PROCEDURE — 99999 PR PBB SHADOW E&M-EST. PATIENT-LVL III: ICD-10-PCS | Mod: PBBFAC,,, | Performed by: OBSTETRICS & GYNECOLOGY

## 2023-11-10 PROCEDURE — 99213 OFFICE O/P EST LOW 20 MIN: CPT | Mod: TH,S$PBB,, | Performed by: OBSTETRICS & GYNECOLOGY

## 2023-11-10 PROCEDURE — 99213 OFFICE O/P EST LOW 20 MIN: CPT | Mod: PBBFAC,TH | Performed by: OBSTETRICS & GYNECOLOGY

## 2023-11-13 LAB — BACTERIA SPEC AEROBE CULT: NORMAL

## 2023-11-24 ENCOUNTER — ROUTINE PRENATAL (OUTPATIENT)
Dept: OBSTETRICS AND GYNECOLOGY | Facility: CLINIC | Age: 33
End: 2023-11-24
Payer: MEDICAID

## 2023-11-24 ENCOUNTER — PROCEDURE VISIT (OUTPATIENT)
Dept: OBSTETRICS AND GYNECOLOGY | Facility: CLINIC | Age: 33
End: 2023-11-24
Payer: MEDICAID

## 2023-11-24 VITALS
BODY MASS INDEX: 45.76 KG/M2 | DIASTOLIC BLOOD PRESSURE: 74 MMHG | WEIGHT: 242.19 LBS | HEART RATE: 87 BPM | SYSTOLIC BLOOD PRESSURE: 124 MMHG

## 2023-11-24 DIAGNOSIS — Z34.83 ENCOUNTER FOR SUPERVISION OF OTHER NORMAL PREGNANCY IN THIRD TRIMESTER: Primary | ICD-10-CM

## 2023-11-24 DIAGNOSIS — Z98.891 PREVIOUS CESAREAN SECTION: ICD-10-CM

## 2023-11-24 DIAGNOSIS — Z3A.35 35 WEEKS GESTATION OF PREGNANCY: ICD-10-CM

## 2023-11-24 DIAGNOSIS — Z3A.37 37 WEEKS GESTATION OF PREGNANCY: ICD-10-CM

## 2023-11-24 PROCEDURE — 99213 PR OFFICE/OUTPT VISIT, EST, LEVL III, 20-29 MIN: ICD-10-PCS | Mod: TH,S$PBB,, | Performed by: OBSTETRICS & GYNECOLOGY

## 2023-11-24 PROCEDURE — 99999 PR PBB SHADOW E&M-EST. PATIENT-LVL III: CPT | Mod: PBBFAC,,, | Performed by: OBSTETRICS & GYNECOLOGY

## 2023-11-24 PROCEDURE — 99213 OFFICE O/P EST LOW 20 MIN: CPT | Mod: TH,S$PBB,, | Performed by: OBSTETRICS & GYNECOLOGY

## 2023-11-24 PROCEDURE — 76819 FETAL BIOPHYS PROFIL W/O NST: CPT | Mod: 26,S$PBB,, | Performed by: OBSTETRICS & GYNECOLOGY

## 2023-11-24 PROCEDURE — 99999 PR PBB SHADOW E&M-EST. PATIENT-LVL III: ICD-10-PCS | Mod: PBBFAC,,, | Performed by: OBSTETRICS & GYNECOLOGY

## 2023-11-24 PROCEDURE — 76816 OB US FOLLOW-UP PER FETUS: CPT | Mod: PBBFAC | Performed by: OBSTETRICS & GYNECOLOGY

## 2023-11-24 PROCEDURE — 99213 OFFICE O/P EST LOW 20 MIN: CPT | Mod: PBBFAC,TH | Performed by: OBSTETRICS & GYNECOLOGY

## 2023-11-24 PROCEDURE — 76819 US OB/GYN EXTENDED PROCEDURE (VIEWPOINT): ICD-10-PCS | Mod: 26,S$PBB,, | Performed by: OBSTETRICS & GYNECOLOGY

## 2023-11-24 PROCEDURE — 76816 US OB/GYN EXTENDED PROCEDURE (VIEWPOINT): ICD-10-PCS | Mod: 26,S$PBB,, | Performed by: OBSTETRICS & GYNECOLOGY

## 2023-11-24 RX ORDER — FLUTICASONE PROPIONATE 50 MCG
1 SPRAY, SUSPENSION (ML) NASAL
COMMUNITY
Start: 2023-08-17

## 2023-11-24 RX ORDER — LORATADINE 10 MG/1
10 TABLET ORAL
COMMUNITY
Start: 2023-08-17

## 2023-11-30 ENCOUNTER — ROUTINE PRENATAL (OUTPATIENT)
Dept: OBSTETRICS AND GYNECOLOGY | Facility: CLINIC | Age: 33
End: 2023-11-30
Payer: MEDICAID

## 2023-11-30 VITALS
SYSTOLIC BLOOD PRESSURE: 128 MMHG | BODY MASS INDEX: 45.59 KG/M2 | DIASTOLIC BLOOD PRESSURE: 76 MMHG | WEIGHT: 241.31 LBS | HEART RATE: 99 BPM

## 2023-11-30 DIAGNOSIS — Z34.83 ENCOUNTER FOR SUPERVISION OF OTHER NORMAL PREGNANCY IN THIRD TRIMESTER: ICD-10-CM

## 2023-11-30 DIAGNOSIS — Z98.891 PREVIOUS CESAREAN SECTION: Primary | ICD-10-CM

## 2023-11-30 DIAGNOSIS — Z01.818 PREOP TESTING: ICD-10-CM

## 2023-11-30 DIAGNOSIS — Z3A.38 38 WEEKS GESTATION OF PREGNANCY: ICD-10-CM

## 2023-11-30 PROCEDURE — 99213 OFFICE O/P EST LOW 20 MIN: CPT | Mod: TH,S$PBB,, | Performed by: OBSTETRICS & GYNECOLOGY

## 2023-11-30 PROCEDURE — 99999 PR PBB SHADOW E&M-EST. PATIENT-LVL III: ICD-10-PCS | Mod: PBBFAC,,, | Performed by: OBSTETRICS & GYNECOLOGY

## 2023-11-30 PROCEDURE — 99213 OFFICE O/P EST LOW 20 MIN: CPT | Mod: PBBFAC,TH | Performed by: OBSTETRICS & GYNECOLOGY

## 2023-11-30 PROCEDURE — 99999 PR PBB SHADOW E&M-EST. PATIENT-LVL III: CPT | Mod: PBBFAC,,, | Performed by: OBSTETRICS & GYNECOLOGY

## 2023-11-30 PROCEDURE — 99213 PR OFFICE/OUTPT VISIT, EST, LEVL III, 20-29 MIN: ICD-10-PCS | Mod: TH,S$PBB,, | Performed by: OBSTETRICS & GYNECOLOGY

## 2023-11-30 RX ORDER — SODIUM CHLORIDE, SODIUM LACTATE, POTASSIUM CHLORIDE, CALCIUM CHLORIDE 600; 310; 30; 20 MG/100ML; MG/100ML; MG/100ML; MG/100ML
INJECTION, SOLUTION INTRAVENOUS CONTINUOUS
Status: CANCELLED | OUTPATIENT
Start: 2023-11-30

## 2023-11-30 RX ORDER — ENOXAPARIN SODIUM 300 MG/3ML
40 INJECTION INTRAVENOUS; SUBCUTANEOUS EVERY 24 HOURS
Status: CANCELLED | OUTPATIENT
Start: 2023-11-30

## 2023-11-30 RX ORDER — DIPHENHYDRAMINE HCL 25 MG
25 CAPSULE ORAL EVERY 6 HOURS PRN
Status: CANCELLED | OUTPATIENT
Start: 2023-11-30

## 2023-11-30 RX ORDER — DIPHENHYDRAMINE HYDROCHLORIDE 50 MG/ML
12.5 INJECTION INTRAMUSCULAR; INTRAVENOUS
Status: CANCELLED | OUTPATIENT
Start: 2023-11-30

## 2023-11-30 RX ORDER — CARBOPROST TROMETHAMINE 250 UG/ML
250 INJECTION, SOLUTION INTRAMUSCULAR
Status: CANCELLED | OUTPATIENT
Start: 2023-11-30

## 2023-11-30 RX ORDER — SIMETHICONE 80 MG
1 TABLET,CHEWABLE ORAL EVERY 6 HOURS PRN
Status: CANCELLED | OUTPATIENT
Start: 2023-11-30

## 2023-11-30 RX ORDER — AMOXICILLIN 250 MG
1 CAPSULE ORAL NIGHTLY PRN
Status: CANCELLED | OUTPATIENT
Start: 2023-11-30

## 2023-11-30 RX ORDER — PROCHLORPERAZINE EDISYLATE 5 MG/ML
5 INJECTION INTRAMUSCULAR; INTRAVENOUS EVERY 6 HOURS PRN
Status: CANCELLED | OUTPATIENT
Start: 2023-11-30

## 2023-11-30 RX ORDER — IBUPROFEN 200 MG
800 TABLET ORAL
Status: CANCELLED | OUTPATIENT
Start: 2023-12-01

## 2023-11-30 RX ORDER — MISOPROSTOL 100 UG/1
800 TABLET ORAL ONCE AS NEEDED
Status: CANCELLED | OUTPATIENT
Start: 2023-11-30 | End: 2035-04-28

## 2023-11-30 RX ORDER — ONDANSETRON 4 MG/1
8 TABLET, ORALLY DISINTEGRATING ORAL EVERY 8 HOURS PRN
Status: CANCELLED | OUTPATIENT
Start: 2023-11-30

## 2023-11-30 RX ORDER — NALBUPHINE HYDROCHLORIDE 10 MG/ML
5 INJECTION, SOLUTION INTRAMUSCULAR; INTRAVENOUS; SUBCUTANEOUS ONCE AS NEEDED
Status: CANCELLED | OUTPATIENT
Start: 2023-11-30 | End: 2035-04-28

## 2023-11-30 RX ORDER — DOCUSATE SODIUM 100 MG/1
200 CAPSULE, LIQUID FILLED ORAL 2 TIMES DAILY
Status: CANCELLED | OUTPATIENT
Start: 2023-11-30

## 2023-11-30 RX ORDER — HYDROCORTISONE 25 MG/G
CREAM TOPICAL 3 TIMES DAILY PRN
Status: CANCELLED | OUTPATIENT
Start: 2023-11-30

## 2023-11-30 RX ORDER — METHYLERGONOVINE MALEATE 0.2 MG/ML
200 INJECTION INTRAVENOUS ONCE AS NEEDED
Status: CANCELLED | OUTPATIENT
Start: 2023-11-30 | End: 2035-04-28

## 2023-11-30 RX ORDER — ONDANSETRON 2 MG/ML
4 INJECTION INTRAMUSCULAR; INTRAVENOUS EVERY 6 HOURS PRN
Status: CANCELLED | OUTPATIENT
Start: 2023-11-30

## 2023-11-30 RX ORDER — OXYCODONE HYDROCHLORIDE 5 MG/1
5 TABLET ORAL EVERY 4 HOURS PRN
Status: CANCELLED | OUTPATIENT
Start: 2023-11-30

## 2023-11-30 RX ORDER — FAMOTIDINE 10 MG/ML
20 INJECTION INTRAVENOUS
Status: CANCELLED | OUTPATIENT
Start: 2023-11-30

## 2023-11-30 RX ORDER — SODIUM CITRATE AND CITRIC ACID MONOHYDRATE 334; 500 MG/5ML; MG/5ML
30 SOLUTION ORAL
Status: CANCELLED | OUTPATIENT
Start: 2023-11-30

## 2023-11-30 RX ORDER — ACETAMINOPHEN 325 MG/1
650 TABLET ORAL
Status: CANCELLED | OUTPATIENT
Start: 2023-11-30

## 2023-11-30 RX ORDER — KETOROLAC TROMETHAMINE 30 MG/ML
30 INJECTION, SOLUTION INTRAMUSCULAR; INTRAVENOUS
Status: CANCELLED | OUTPATIENT
Start: 2023-11-30 | End: 2023-12-01

## 2023-11-30 RX ORDER — OXYTOCIN/RINGER'S LACTATE 30/500 ML
95 PLASTIC BAG, INJECTION (ML) INTRAVENOUS CONTINUOUS
Status: CANCELLED | OUTPATIENT
Start: 2023-11-30

## 2023-11-30 RX ORDER — ACETAMINOPHEN 500 MG
1000 TABLET ORAL
Status: CANCELLED | OUTPATIENT
Start: 2023-11-30

## 2023-11-30 RX ORDER — OXYCODONE HYDROCHLORIDE 5 MG/1
10 TABLET ORAL EVERY 4 HOURS PRN
Status: CANCELLED | OUTPATIENT
Start: 2023-11-30

## 2023-11-30 RX ORDER — PRENATAL WITH FERROUS FUM AND FOLIC ACID 3080; 920; 120; 400; 22; 1.84; 3; 20; 10; 1; 12; 200; 27; 25; 2 [IU]/1; [IU]/1; MG/1; [IU]/1; MG/1; MG/1; MG/1; MG/1; MG/1; MG/1; UG/1; MG/1; MG/1; MG/1; MG/1
1 TABLET ORAL DAILY
Status: CANCELLED | OUTPATIENT
Start: 2023-11-30

## 2023-11-30 NOTE — H&P
History and Physical  Obstetrics      SUBJECTIVE:     Kate Gomez is a 32 y.o.  female  at 38w4d weeks gestation with an Estimated Date of Delivery: 12/10/23 who is admitted for repeat  Section. She has been followed prenatally with the following prenatal complications: prior , no problems. She denies Decreased Fetal Movement, Leakage of Fluid, and Vaginal Bleeding. Fetal Movement: normal.      HISTORY:     Prior to Admission medications    Medication Sig Start Date End Date Taking? Authorizing Provider   aspirin (ECOTRIN) 81 MG EC tablet Take 1 tablet (81 mg total) by mouth once daily. 23 Yes Isael Greenfield MD   prenatal vit/iron fum/folic ac (PRENATAL 1+1 ORAL) Take by mouth.   Yes Provider, Historical   fluticasone propionate (FLONASE) 50 mcg/actuation nasal spray 1 spray by Each Nostril route. 23   Provider, Historical   loratadine (CLARITIN) 10 mg tablet Take 10 mg by mouth. 23   Provider, Historical     History reviewed. No pertinent past medical history.  Past Surgical History:   Procedure Laterality Date     SECTION, LOW TRANSVERSE  2013     Family History   Problem Relation Age of Onset    Diabetes Maternal Grandmother     Cancer Maternal Aunt     Diabetes Paternal Grandmother     Breast cancer Neg Hx     Colon cancer Neg Hx     Ovarian cancer Neg Hx      Social History     Tobacco Use    Smoking status: Former     Current packs/day: 0.00     Types: Cigarettes     Quit date:      Years since quittin.9    Smokeless tobacco: Never    Tobacco comments:     told about smoking brochure received on admit-and told to ask doctor for meds and programs available if she decides she wants to stop smoking   Substance Use Topics    Alcohol use: Not Currently     Comment: Occasionally-not during pregnancy    Drug use: Not Currently     Types: Marijuana     Comment: Pt states she is quitting... last used 2 months ago     OB History           3    Para   2    Term   2            AB        Living   2         SAB        IAB        Ectopic        Multiple   0    Live Births   2                 Allergy:   Review of patient's allergies indicates:  No Known Allergies       ROS:   CONSTITUTIONAL: Negative for fever, chills, diaphoresis, weakness, fatigue, weight loss, weight gain  ENT: negative for sore throat, nasal congestion, nasal discharge, epistaxis, tinnitus, hearing loss  EYES: negative for blurry vision, decreased vision, loss of vision, eye pain, diplopia, photophobia, discharge  SKIN: Negative for rash, itching, hives  RESPIRATORY: negative for cough, hemoptysis, shortness of breath, pleuritic chest pain, wheezing  CARDIOVASCULAR: negative for chest pain, dyspnea on exertion, orthopnea, paroxysmal nocturnal dyspnea, edema, palpitations  BREAST: negative for breast  tenderness, breast mass, nipple discharge, or skin changes  GASTROINTESTINAL: negative for abdominal pain, flank pain, nausea, vomiting, diarrhea, constipation, black stool, blood in stool  GENITOURINARY: negative for abnormal vaginal bleeding, amenorrhea, decreased libido, dysuria, genital sores, hematuria, incontinence, menorrhagia, pelvic pain, urinary frequency, vaginal discharge  HEMATOLOGIC/LYMPHATIC: negative for swollen lymph nodes, bleeding, bruising  MUSCULOSKELETAL: negative for back pain, joint pain, joint stiffness, joint swelling, muscle pain, muscle weakness  NEUROLOGICAL: negative for dizzy/vertigo, headache, focal weakness, numbness/tingling, speech problems, loss of consciousness, confusion, memory loss  BEHAVORIAL/PSYCH: negative for anxiety, depression, psychiatric illness  ENDOCRINE: negative for polydipsia/polyuria, palpitations, skin changes, temperature intolerance, unexpected weight changes  ALLERGIC/IMMUNOLOGIC: negative for urticaria, hay fever, angioedema      OBJECTIVE:   [unfilled]       Physical Exam:  General:  alert,normal appearing gravid  female   Skin:  Skin color, texture, turgor normal. No rashes or lesions   HEENT:  conjunctivae/corneas clear. NAGA   Lungs:  clear to auscultation bilaterally   Heart:  regular rate and rhythm, S1, S2 normal, no murmur, click, rub or gallop   Breasts:  no discharge, erythema, or tenderness   Abdomen:  soft, non-tender; bowel sounds normal   Uterine Size:  consistent with dates   Presentations:  cephalic   FHT:  145 BPM   Pelvis: External genitalia: normal external genitalia without lesions  Vaginal: without lesions or discharge   Cervix:     Dilation: Closed    Effacement: Long    Station:  Floating    Consistency: firm    Position: middle       OB Lab History:     Group & Rh   Date Value Ref Range Status   03/31/2023 B POS  Final   01/31/2013 B POS  Final     INDIRECT DEVIN   Date Value Ref Range Status   01/31/2013 NEG  Final     Indirect Devin   Date Value Ref Range Status   03/31/2023 NEG  Final     Lab Results   Component Value Date    WBC 6.53 09/25/2023    HGB 10.4 (L) 09/25/2023    HCT 34.0 (L) 09/25/2023    MCV 77 (L) 09/25/2023     09/25/2023         Lab Results   Component Value Date    TSH 1.275 03/31/2023     Lab Results   Component Value Date    RUBELLAIGGAN 13.6 03/31/2023     Lab Results   Component Value Date    RPR Non-reactive 03/31/2023     HIV 1/2 Ag/Ab   Date Value Ref Range Status   03/31/2023 Non-reactive Non-reactive Final     Hepatitis B Surface Ag   Date Value Ref Range Status   03/31/2023 Non-reactive Non-reactive Final     Results for orders placed or performed in visit on 09/25/23   OB Glucose Screen   Result Value Ref Range    OB Glucose Screen 174 (H) 70 - 140 mg/dL     Results for orders placed or performed in visit on 11/10/23   STREP B SCREEN, VAGINAL / RECTAL    Specimen: Vaginal/Rectal   Result Value Ref Range    Strep B Culture No Group B Streptococcus isolated      Results for orders placed or performed in visit on 03/08/13   Maternal Serum, Quad Screen   Result  Value Ref Range    Quad Screen NEGATIVE NEGATIVE    Maternal Age at OPAL (Yrs) 21     Date of Birth [id number]     Gestational Age (Weeks) 17     Gestational Age (Days) 4     Maternal Weight (lbs) 197     Multiple Gestations NOT GIVN     Ethnic Origin WHITE     Insulin Depend. Diabetes NO     Alpha Fetoprotein Maternal 30.1 ng/mL    Unconjugated Estriol 1.00 ng/mL    Human Chorionic Gonadotropin 4.6 IU/mL    Inhibin A 168.20 pg/mL    M.O.M. Alpha Fetoprotein 0.89     M.O.M. Unconj. Estriol 0.91     M.O.M. HCG 0.26     M.O.M. Inhibin A 1.17     Down Risk (<1:270) 1:27623     Neural Tube Risk(<2.20) N/A     AFP Interpretation SEE BELOW      Results for orders placed or performed in visit on 23   C. trachomatis/N. gonorrhoeae by AMP DNA [facility]; Cervix    Specimen: Genital   Result Value Ref Range    Chlamydia, Amplified DNA Not Detected Not Detected    N gonorrhoeae, amplified DNA Not Detected Not Detected       ASSESSMENT/PLAN:     IUP 38w4d gestation      Patient Active Problem List   Diagnosis    Previous  section    Encounter for supervision of normal pregnancy in third trimester    Marijuana use           PLAN:   Repeat  Section          Patient cleared for Anesthesia:  Spinal    Anesthesia/Surgery risks, benefits, and alternative options discussed and understood by patient/family.

## 2023-11-30 NOTE — H&P (VIEW-ONLY)
History and Physical  Obstetrics      SUBJECTIVE:     Kate Gomez is a 32 y.o.  female  at 38w4d weeks gestation with an Estimated Date of Delivery: 12/10/23 who is admitted for repeat  Section. She has been followed prenatally with the following prenatal complications: prior , no problems. She denies Decreased Fetal Movement, Leakage of Fluid, and Vaginal Bleeding. Fetal Movement: normal.      HISTORY:     Prior to Admission medications    Medication Sig Start Date End Date Taking? Authorizing Provider   aspirin (ECOTRIN) 81 MG EC tablet Take 1 tablet (81 mg total) by mouth once daily. 23 Yes Isael Greenfield MD   prenatal vit/iron fum/folic ac (PRENATAL 1+1 ORAL) Take by mouth.   Yes Provider, Historical   fluticasone propionate (FLONASE) 50 mcg/actuation nasal spray 1 spray by Each Nostril route. 23   Provider, Historical   loratadine (CLARITIN) 10 mg tablet Take 10 mg by mouth. 23   Provider, Historical     History reviewed. No pertinent past medical history.  Past Surgical History:   Procedure Laterality Date     SECTION, LOW TRANSVERSE  2013     Family History   Problem Relation Age of Onset    Diabetes Maternal Grandmother     Cancer Maternal Aunt     Diabetes Paternal Grandmother     Breast cancer Neg Hx     Colon cancer Neg Hx     Ovarian cancer Neg Hx      Social History     Tobacco Use    Smoking status: Former     Current packs/day: 0.00     Types: Cigarettes     Quit date:      Years since quittin.9    Smokeless tobacco: Never    Tobacco comments:     told about smoking brochure received on admit-and told to ask doctor for meds and programs available if she decides she wants to stop smoking   Substance Use Topics    Alcohol use: Not Currently     Comment: Occasionally-not during pregnancy    Drug use: Not Currently     Types: Marijuana     Comment: Pt states she is quitting... last used 2 months ago     OB History           3    Para   2    Term   2            AB        Living   2         SAB        IAB        Ectopic        Multiple   0    Live Births   2                 Allergy:   Review of patient's allergies indicates:  No Known Allergies       ROS:   CONSTITUTIONAL: Negative for fever, chills, diaphoresis, weakness, fatigue, weight loss, weight gain  ENT: negative for sore throat, nasal congestion, nasal discharge, epistaxis, tinnitus, hearing loss  EYES: negative for blurry vision, decreased vision, loss of vision, eye pain, diplopia, photophobia, discharge  SKIN: Negative for rash, itching, hives  RESPIRATORY: negative for cough, hemoptysis, shortness of breath, pleuritic chest pain, wheezing  CARDIOVASCULAR: negative for chest pain, dyspnea on exertion, orthopnea, paroxysmal nocturnal dyspnea, edema, palpitations  BREAST: negative for breast  tenderness, breast mass, nipple discharge, or skin changes  GASTROINTESTINAL: negative for abdominal pain, flank pain, nausea, vomiting, diarrhea, constipation, black stool, blood in stool  GENITOURINARY: negative for abnormal vaginal bleeding, amenorrhea, decreased libido, dysuria, genital sores, hematuria, incontinence, menorrhagia, pelvic pain, urinary frequency, vaginal discharge  HEMATOLOGIC/LYMPHATIC: negative for swollen lymph nodes, bleeding, bruising  MUSCULOSKELETAL: negative for back pain, joint pain, joint stiffness, joint swelling, muscle pain, muscle weakness  NEUROLOGICAL: negative for dizzy/vertigo, headache, focal weakness, numbness/tingling, speech problems, loss of consciousness, confusion, memory loss  BEHAVORIAL/PSYCH: negative for anxiety, depression, psychiatric illness  ENDOCRINE: negative for polydipsia/polyuria, palpitations, skin changes, temperature intolerance, unexpected weight changes  ALLERGIC/IMMUNOLOGIC: negative for urticaria, hay fever, angioedema      OBJECTIVE:   [unfilled]       Physical Exam:  General:  alert,normal appearing gravid  female   Skin:  Skin color, texture, turgor normal. No rashes or lesions   HEENT:  conjunctivae/corneas clear. NAGA   Lungs:  clear to auscultation bilaterally   Heart:  regular rate and rhythm, S1, S2 normal, no murmur, click, rub or gallop   Breasts:  no discharge, erythema, or tenderness   Abdomen:  soft, non-tender; bowel sounds normal   Uterine Size:  consistent with dates   Presentations:  cephalic   FHT:  145 BPM   Pelvis: External genitalia: normal external genitalia without lesions  Vaginal: without lesions or discharge   Cervix:     Dilation: Closed    Effacement: Long    Station:  Floating    Consistency: firm    Position: middle       OB Lab History:     Group & Rh   Date Value Ref Range Status   03/31/2023 B POS  Final   01/31/2013 B POS  Final     INDIRECT DEVIN   Date Value Ref Range Status   01/31/2013 NEG  Final     Indirect Devin   Date Value Ref Range Status   03/31/2023 NEG  Final     Lab Results   Component Value Date    WBC 6.53 09/25/2023    HGB 10.4 (L) 09/25/2023    HCT 34.0 (L) 09/25/2023    MCV 77 (L) 09/25/2023     09/25/2023         Lab Results   Component Value Date    TSH 1.275 03/31/2023     Lab Results   Component Value Date    RUBELLAIGGAN 13.6 03/31/2023     Lab Results   Component Value Date    RPR Non-reactive 03/31/2023     HIV 1/2 Ag/Ab   Date Value Ref Range Status   03/31/2023 Non-reactive Non-reactive Final     Hepatitis B Surface Ag   Date Value Ref Range Status   03/31/2023 Non-reactive Non-reactive Final     Results for orders placed or performed in visit on 09/25/23   OB Glucose Screen   Result Value Ref Range    OB Glucose Screen 174 (H) 70 - 140 mg/dL     Results for orders placed or performed in visit on 11/10/23   STREP B SCREEN, VAGINAL / RECTAL    Specimen: Vaginal/Rectal   Result Value Ref Range    Strep B Culture No Group B Streptococcus isolated      Results for orders placed or performed in visit on 03/08/13   Maternal Serum, Quad Screen   Result  Value Ref Range    Quad Screen NEGATIVE NEGATIVE    Maternal Age at OPAL (Yrs) 21     Date of Birth [id number]     Gestational Age (Weeks) 17     Gestational Age (Days) 4     Maternal Weight (lbs) 197     Multiple Gestations NOT GIVN     Ethnic Origin WHITE     Insulin Depend. Diabetes NO     Alpha Fetoprotein Maternal 30.1 ng/mL    Unconjugated Estriol 1.00 ng/mL    Human Chorionic Gonadotropin 4.6 IU/mL    Inhibin A 168.20 pg/mL    M.O.M. Alpha Fetoprotein 0.89     M.O.M. Unconj. Estriol 0.91     M.O.M. HCG 0.26     M.O.M. Inhibin A 1.17     Down Risk (<1:270) 1:74231     Neural Tube Risk(<2.20) N/A     AFP Interpretation SEE BELOW      Results for orders placed or performed in visit on 23   C. trachomatis/N. gonorrhoeae by AMP DNA [facility]; Cervix    Specimen: Genital   Result Value Ref Range    Chlamydia, Amplified DNA Not Detected Not Detected    N gonorrhoeae, amplified DNA Not Detected Not Detected       ASSESSMENT/PLAN:     IUP 38w4d gestation      Patient Active Problem List   Diagnosis    Previous  section    Encounter for supervision of normal pregnancy in third trimester    Marijuana use           PLAN:   Repeat  Section          Patient cleared for Anesthesia:  Spinal    Anesthesia/Surgery risks, benefits, and alternative options discussed and understood by patient/family.

## 2023-12-01 ENCOUNTER — HOSPITAL ENCOUNTER (OUTPATIENT)
Dept: PREADMISSION TESTING | Facility: HOSPITAL | Age: 33
Discharge: HOME | End: 2023-12-01
Attending: OBSTETRICS & GYNECOLOGY
Payer: MEDICAID

## 2023-12-01 NOTE — DISCHARGE INSTRUCTIONS
Pre Admit Instructions    Day and Date of Procedure:      Call your doctor if you become ill before your surgery  Someone will call you between 1 p.m. And 5 p.m.the workday before the procedure to give you an arrival time       - Before 7 a.m. Enter through Emergency Room       - 7 a.m. To 5 p.m. Enter through main entrance  You must have a responsible  to bring you home    Do NOT eat anything past:  Ok to have clear liquids until:   NO dairy or creamers    Please    Do not wear makeup, jewelry, nail polish or body piercings  Bring containers/solution for contacts, dentures, bridges - these and hearing aids will be removed before your procedure  Do not bring cash, jewelry or valuables the day of your procedure   No smoking at least 24 hours before your procedure  Wear clothing that is comfortable and easy to take off and put on  Do NOT shave for at least 5 days before your surgery      Visitor Policy: subject to change    Due to Covid-19 we have changed our visitor policy for the safety of our patients and staff.  You are allowed 2 people while you are in the Labor and Delivery Department. You will not be allowed to change visitors until the next day.   You are allowed 1 person to stay with you in the Mother Baby Department. You will not be allowed to change this visitor out until the next day.   No young children will be allowed to visit during your stay for their safety.       PRE-OP Hibiclens bath Instructions:    Shower with Hibiclens the Night before and morning of the procedure.   Wash your face with your regular cleanser. DO NOT use Hibiclens on your face.  Thoroughly rinse your body with warm water from the neck down  Apply Hibiclens directly to your skin or on a wet washcloth and wash gently. Do not stand under the water while washing with Hibiclens as you will remove the wash too soon.  Rinse thoroughly with warm water  DO NOT use your regular soap after you have bathed with the Hibiclens  Do not  apply lotion or deodorant to the surgical site  Put on a clean pair of clothes after each bath          Information About Your Procedure    Hand Outs about Rooming in, Skin-to-skin Bonding, Packing tips are in your packet  Before Surgery  Cafeteria Meals: 7am to 10am; 11am to 1:30 pm; Dinner/Supper must may be ordered between 11:00 am and 4 pm from the [facility] After [facility] Menu. Food will be available to  between 5 pm and 6 pm. The kitchen phone extension is [phone].  Your doctor may order and review labs, x-rays, ECG or other tests as a pre-surgery workup. Your doctor will call you if there is need for follow up.   Someone will escort you to the OB department after you check in .  No smoking before surgery-CDC recommends 30 days, but at least 12 hours.  No smoking inside or outside the hospital on hospital grounds.  Call your doctor if you get sick before surgery-cold, flu, fever, urine infection or other.  Wear clothing that is easy to take off and put on.  The hospital will provide you with a gown.  You may bring robe, slippers, nightwear, and toiletries (toothbrush, toothpaste, makeup). You can leave your luggage in your vehicle until after the delivery, so that you do not have to carry it around the hospital.   Brush your teeth and rinse your mouth the morning of surgery, but dont swallow the water.  The nurse will ask questions and check your condition.  The doctor may gregory your surgical site.  Compression boots may be put on your calves to reduce the risk of blood clots.  The doctor may order medicine to help you relax before surgery.  You and your baby will go back to the OB Department after your C. Section to recover after surgery.   After Surgery  The nurse will check your temperature, breathing, blood pressure, heart rate, IV site, and surgery site.  A diet will be ordered-most start with ice chips and then advance slowly to other foods.  If you have IV fluids the IV pump will beep to let the  nurse know that she needs to check it.  You may have a urinary catheter and staff may measure your oral intake and urine output. When removed let the nurse know if you have trouble urinating. Uterus cannot contract properly to stop the bleeding if it is displaced by the bladder.   Pain medication may be ordered by the doctor after surgery.  If you have a pain management device tell your caretakers not to press the button because of OVERDOSE RISK. If you are breastfeeding the nurses will help you coordinate breast feeding with pain management.   When the nurse or doctor tells you it is okay to get out of bed, ask for help until you are stable.  The nurse may ask you to turn, cough, and deep breathe to prevent lung problems.  You can use a pillow to hold your incision when you deep breathe or cough to reduce pain.  The nurse will give you discharge instructions--incision care, symptoms to report to your doctor, and your follow-up appointment when you are discharged.  You cannot drive yourself home.    Patient Responsibility to Reduce the Risk of Infections or Complications  Wash Hands and use Waterless Hand Sanitizers  Wash hands frequently with soap and warm water for at least 15 seconds.   Use hand sanitizers (alcohol based) often at home and in public if hands are not visibly soiled  Take Antibiotic Exactly as Prescribed  Do not stop antibiotics too soon; you risk developing infection resistant to antibiotics  Take your antibiotic even if you are feeling better and even if they upset your stomach  Call the doctor if you cant tolerate the antibiotic or you have an allergic reaction  Stay Healthy  Take medicines as prescribed by your doctor  Keep your diabetes under control - diet and medication  Get enough rest, exercise and eat a healthy diet  Keep the Wound Clean and Dry  Wash hands before and after taking care of the incision (cut)  Wash hands when you remove a dressing, before you touch/apply a new  dressing  Shower and clean incision with antibacterial soap and rinse well if the doctor approves  Allow the cut to dry completely before putting on a clean dressing  Do not touch the part of the bandage that will cover the incision  Do not use ointments unless your doctor tells you to-can promote bacterial growth  If ordered, put ointment directly on the dressing-do not touch the end of the tube  Do not scrub, remove scabs, or leave a damp dressing on the incision  Do not use peroxide or alcohol to clean the incision unless the doctor tells you to   Do not let children, pets or anyone else contaminate the incision  Stop Smoking To Prevent Infection  Stop smoking-Centers for Disease Control recommends 30 days before surgery  Smokers get more infections after surgery-studies have shown 6 times the risk  Smokers have more scarring and heal slower-open wounds get infected easier  Prevent Respiratory complications  Stop smoking  Turn, cough, and deep breathe even if you have some pain when you do so.  Splint your incision with a pillow when you cough/deep breath, to help control pain.  Do not lie in one position for long periods of time.   Prevent Blood Clots  When you wake move your legs, flex your feet, rotate your ankles, wiggle your toes  Get up when the doctor says its ok.  Dangle your feet from the side of the bed  Report symptoms-leg pain, redness/swelling, warm to touch; fever; shortness of breath, chest pain, severe upper back pain.

## 2023-12-04 ENCOUNTER — ANESTHESIA (OUTPATIENT)
Dept: SURGERY | Facility: HOSPITAL | Age: 33
End: 2023-12-04
Payer: MEDICAID

## 2023-12-04 ENCOUNTER — HOSPITAL ENCOUNTER (INPATIENT)
Facility: HOSPITAL | Age: 33
LOS: 2 days | Discharge: HOME | End: 2023-12-06
Attending: OBSTETRICS & GYNECOLOGY | Admitting: OBSTETRICS & GYNECOLOGY
Payer: MEDICAID

## 2023-12-04 ENCOUNTER — ANESTHESIA (OUTPATIENT)
Dept: OBSTETRICS AND GYNECOLOGY | Facility: HOSPITAL | Age: 33
End: 2023-12-04

## 2023-12-04 ENCOUNTER — ANESTHESIA EVENT (OUTPATIENT)
Dept: SURGERY | Facility: HOSPITAL | Age: 33
End: 2023-12-04
Payer: MEDICAID

## 2023-12-04 ENCOUNTER — ANESTHESIA EVENT (OUTPATIENT)
Dept: OBSTETRICS AND GYNECOLOGY | Facility: HOSPITAL | Age: 33
End: 2023-12-04

## 2023-12-04 DIAGNOSIS — Z98.891 PREVIOUS CESAREAN SECTION: ICD-10-CM

## 2023-12-04 PROCEDURE — 63600175 PHARM REV CODE 636 W HCPCS: Performed by: OBSTETRICS & GYNECOLOGY

## 2023-12-04 PROCEDURE — 99900059 HC C-SECTION ATTEND (STAT)

## 2023-12-04 PROCEDURE — 37000009 HC ANESTHESIA EA ADD 15 MINS: Performed by: OBSTETRICS & GYNECOLOGY

## 2023-12-04 PROCEDURE — 59514 PR CESAREAN DELIVERY ONLY: ICD-10-PCS | Mod: 80,GB,, | Performed by: OBSTETRICS & GYNECOLOGY

## 2023-12-04 PROCEDURE — 51702 INSERT TEMP BLADDER CATH: CPT

## 2023-12-04 PROCEDURE — 63600175 PHARM REV CODE 636 W HCPCS

## 2023-12-04 PROCEDURE — 11000001 HC ACUTE MED/SURG PRIVATE ROOM

## 2023-12-04 PROCEDURE — 59514 CESAREAN DELIVERY ONLY: CPT | Mod: GB,,, | Performed by: OBSTETRICS & GYNECOLOGY

## 2023-12-04 PROCEDURE — 59514 PR CESAREAN DELIVERY ONLY: ICD-10-PCS | Mod: GB,,, | Performed by: OBSTETRICS & GYNECOLOGY

## 2023-12-04 PROCEDURE — 99900035 HC TECH TIME PER 15 MIN (STAT)

## 2023-12-04 PROCEDURE — 25000003 PHARM REV CODE 250: Performed by: OBSTETRICS & GYNECOLOGY

## 2023-12-04 PROCEDURE — 59514 CESAREAN DELIVERY ONLY: CPT | Mod: 80,GB,, | Performed by: OBSTETRICS & GYNECOLOGY

## 2023-12-04 PROCEDURE — 36000709 HC OR TIME LEV III EA ADD 15 MIN: Performed by: OBSTETRICS & GYNECOLOGY

## 2023-12-04 PROCEDURE — 25000003 PHARM REV CODE 250

## 2023-12-04 PROCEDURE — 01961 ANES CESAREAN DELIVERY ONLY: CPT | Mod: QZ,P2 | Performed by: NURSE ANESTHETIST, CERTIFIED REGISTERED

## 2023-12-04 PROCEDURE — 27000492 HC SLEEVE, SCD T/L

## 2023-12-04 PROCEDURE — 37000008 HC ANESTHESIA 1ST 15 MINUTES: Performed by: OBSTETRICS & GYNECOLOGY

## 2023-12-04 PROCEDURE — 59514AH PRA REAN DELIVERY ONLY: Mod: QZ,P2 | Performed by: NURSE ANESTHETIST, CERTIFIED REGISTERED

## 2023-12-04 PROCEDURE — 36000708 HC OR TIME LEV III 1ST 15 MIN: Performed by: OBSTETRICS & GYNECOLOGY

## 2023-12-04 RX ORDER — FENTANYL CITRATE 50 UG/ML
INJECTION, SOLUTION INTRAMUSCULAR; INTRAVENOUS
Status: COMPLETED
Start: 2023-12-04 | End: 2023-12-04

## 2023-12-04 RX ORDER — ROPIVACAINE HYDROCHLORIDE 5 MG/ML
INJECTION, SOLUTION EPIDURAL; INFILTRATION; PERINEURAL
Status: DISCONTINUED
Start: 2023-12-04 | End: 2023-12-04 | Stop reason: WASHOUT

## 2023-12-04 RX ORDER — CARBOPROST TROMETHAMINE 250 UG/ML
250 INJECTION, SOLUTION INTRAMUSCULAR
Status: DISCONTINUED | OUTPATIENT
Start: 2023-12-04 | End: 2023-12-06 | Stop reason: HOSPADM

## 2023-12-04 RX ORDER — DIPHENHYDRAMINE HCL 25 MG
25 CAPSULE ORAL EVERY 6 HOURS PRN
Status: DISCONTINUED | OUTPATIENT
Start: 2023-12-04 | End: 2023-12-06 | Stop reason: HOSPADM

## 2023-12-04 RX ORDER — PROCHLORPERAZINE EDISYLATE 5 MG/ML
5 INJECTION INTRAMUSCULAR; INTRAVENOUS EVERY 6 HOURS PRN
Status: DISCONTINUED | OUTPATIENT
Start: 2023-12-04 | End: 2023-12-06 | Stop reason: HOSPADM

## 2023-12-04 RX ORDER — DIPHENHYDRAMINE HYDROCHLORIDE 50 MG/ML
12.5 INJECTION INTRAMUSCULAR; INTRAVENOUS
Status: DISCONTINUED | OUTPATIENT
Start: 2023-12-04 | End: 2023-12-06 | Stop reason: HOSPADM

## 2023-12-04 RX ORDER — AMOXICILLIN 250 MG
1 CAPSULE ORAL NIGHTLY PRN
Status: DISCONTINUED | OUTPATIENT
Start: 2023-12-04 | End: 2023-12-06 | Stop reason: HOSPADM

## 2023-12-04 RX ORDER — SIMETHICONE 80 MG
1 TABLET,CHEWABLE ORAL EVERY 6 HOURS PRN
Status: DISCONTINUED | OUTPATIENT
Start: 2023-12-04 | End: 2023-12-06 | Stop reason: HOSPADM

## 2023-12-04 RX ORDER — DOCUSATE SODIUM 100 MG/1
200 CAPSULE, LIQUID FILLED ORAL 2 TIMES DAILY
Status: DISCONTINUED | OUTPATIENT
Start: 2023-12-04 | End: 2023-12-06 | Stop reason: HOSPADM

## 2023-12-04 RX ORDER — FAMOTIDINE 10 MG/ML
20 INJECTION INTRAVENOUS
Status: COMPLETED | OUTPATIENT
Start: 2023-12-04 | End: 2023-12-04

## 2023-12-04 RX ORDER — KETOROLAC TROMETHAMINE 30 MG/ML
30 INJECTION, SOLUTION INTRAMUSCULAR; INTRAVENOUS
Status: COMPLETED | OUTPATIENT
Start: 2023-12-04 | End: 2023-12-05

## 2023-12-04 RX ORDER — FENTANYL CITRATE 50 UG/ML
INJECTION, SOLUTION INTRAMUSCULAR; INTRAVENOUS
Status: DISCONTINUED | OUTPATIENT
Start: 2023-12-04 | End: 2023-12-04

## 2023-12-04 RX ORDER — METHYLERGONOVINE MALEATE 0.2 MG/ML
200 INJECTION INTRAVENOUS ONCE AS NEEDED
Status: DISCONTINUED | OUTPATIENT
Start: 2023-12-04 | End: 2023-12-06 | Stop reason: HOSPADM

## 2023-12-04 RX ORDER — CEFAZOLIN 2 G/1
INJECTION, POWDER, FOR SOLUTION INTRAMUSCULAR; INTRAVENOUS
Status: DISCONTINUED | OUTPATIENT
Start: 2023-12-04 | End: 2023-12-04

## 2023-12-04 RX ORDER — ROPIVACAINE HYDROCHLORIDE 2 MG/ML
INJECTION, SOLUTION EPIDURAL; INFILTRATION; PERINEURAL
Status: DISCONTINUED
Start: 2023-12-04 | End: 2023-12-04 | Stop reason: WASHOUT

## 2023-12-04 RX ORDER — ONDANSETRON HYDROCHLORIDE 2 MG/ML
INJECTION, SOLUTION INTRAMUSCULAR; INTRAVENOUS
Status: DISCONTINUED | OUTPATIENT
Start: 2023-12-04 | End: 2023-12-04

## 2023-12-04 RX ORDER — ONDANSETRON 2 MG/ML
4 INJECTION INTRAMUSCULAR; INTRAVENOUS EVERY 6 HOURS PRN
Status: DISCONTINUED | OUTPATIENT
Start: 2023-12-04 | End: 2023-12-06 | Stop reason: HOSPADM

## 2023-12-04 RX ORDER — MISOPROSTOL 200 UG/1
800 TABLET ORAL ONCE AS NEEDED
Status: DISCONTINUED | OUTPATIENT
Start: 2023-12-04 | End: 2023-12-06 | Stop reason: HOSPADM

## 2023-12-04 RX ORDER — OXYCODONE HYDROCHLORIDE 5 MG/1
5 TABLET ORAL EVERY 4 HOURS PRN
Status: DISCONTINUED | OUTPATIENT
Start: 2023-12-04 | End: 2023-12-06 | Stop reason: HOSPADM

## 2023-12-04 RX ORDER — ONDANSETRON 8 MG/1
8 TABLET, ORALLY DISINTEGRATING ORAL EVERY 8 HOURS PRN
Status: DISCONTINUED | OUTPATIENT
Start: 2023-12-04 | End: 2023-12-06 | Stop reason: HOSPADM

## 2023-12-04 RX ORDER — SODIUM CHLORIDE, SODIUM LACTATE, POTASSIUM CHLORIDE, CALCIUM CHLORIDE 600; 310; 30; 20 MG/100ML; MG/100ML; MG/100ML; MG/100ML
INJECTION, SOLUTION INTRAVENOUS CONTINUOUS
Status: DISCONTINUED | OUTPATIENT
Start: 2023-12-04 | End: 2023-12-06 | Stop reason: HOSPADM

## 2023-12-04 RX ORDER — TRANEXAMIC ACID 10 MG/ML
1000 INJECTION, SOLUTION INTRAVENOUS EVERY 30 MIN PRN
Status: DISCONTINUED | OUTPATIENT
Start: 2023-12-04 | End: 2023-12-06 | Stop reason: HOSPADM

## 2023-12-04 RX ORDER — OXYCODONE HYDROCHLORIDE 5 MG/1
10 TABLET ORAL EVERY 4 HOURS PRN
Status: DISCONTINUED | OUTPATIENT
Start: 2023-12-04 | End: 2023-12-06 | Stop reason: HOSPADM

## 2023-12-04 RX ORDER — OXYTOCIN/RINGER'S LACTATE 30/500 ML
95 PLASTIC BAG, INJECTION (ML) INTRAVENOUS CONTINUOUS
Status: DISCONTINUED | OUTPATIENT
Start: 2023-12-04 | End: 2023-12-04

## 2023-12-04 RX ORDER — PRENATAL WITH FERROUS FUM AND FOLIC ACID 3080; 920; 120; 400; 22; 1.84; 3; 20; 10; 1; 12; 200; 27; 25; 2 [IU]/1; [IU]/1; MG/1; [IU]/1; MG/1; MG/1; MG/1; MG/1; MG/1; MG/1; UG/1; MG/1; MG/1; MG/1; MG/1
1 TABLET ORAL DAILY
Status: DISCONTINUED | OUTPATIENT
Start: 2023-12-04 | End: 2023-12-06 | Stop reason: HOSPADM

## 2023-12-04 RX ORDER — OXYTOCIN 10 [USP'U]/ML
INJECTION, SOLUTION INTRAMUSCULAR; INTRAVENOUS
Status: DISCONTINUED | OUTPATIENT
Start: 2023-12-04 | End: 2023-12-04

## 2023-12-04 RX ORDER — HYDROCORTISONE 25 MG/G
CREAM TOPICAL 3 TIMES DAILY PRN
Status: DISCONTINUED | OUTPATIENT
Start: 2023-12-04 | End: 2023-12-06 | Stop reason: HOSPADM

## 2023-12-04 RX ORDER — SODIUM CITRATE AND CITRIC ACID MONOHYDRATE 334; 500 MG/5ML; MG/5ML
30 SOLUTION ORAL
Status: COMPLETED | OUTPATIENT
Start: 2023-12-04 | End: 2023-12-04

## 2023-12-04 RX ORDER — NALBUPHINE HYDROCHLORIDE 10 MG/ML
5 INJECTION, SOLUTION INTRAMUSCULAR; INTRAVENOUS; SUBCUTANEOUS ONCE AS NEEDED
Status: DISCONTINUED | OUTPATIENT
Start: 2023-12-04 | End: 2023-12-04

## 2023-12-04 RX ORDER — OXYTOCIN/RINGER'S LACTATE 30/500 ML
95 PLASTIC BAG, INJECTION (ML) INTRAVENOUS ONCE
Status: DISCONTINUED | OUTPATIENT
Start: 2023-12-04 | End: 2023-12-06 | Stop reason: HOSPADM

## 2023-12-04 RX ORDER — BUPIVACAINE HYDROCHLORIDE 7.5 MG/ML
INJECTION, SOLUTION INTRASPINAL
Status: DISCONTINUED | OUTPATIENT
Start: 2023-12-04 | End: 2023-12-04

## 2023-12-04 RX ORDER — IBUPROFEN 800 MG/1
800 TABLET ORAL
Status: DISCONTINUED | OUTPATIENT
Start: 2023-12-05 | End: 2023-12-06 | Stop reason: HOSPADM

## 2023-12-04 RX ORDER — ACETAMINOPHEN 325 MG/1
650 TABLET ORAL
Status: DISCONTINUED | OUTPATIENT
Start: 2023-12-04 | End: 2023-12-06 | Stop reason: HOSPADM

## 2023-12-04 RX ORDER — ACETAMINOPHEN 500 MG
1000 TABLET ORAL
Status: COMPLETED | OUTPATIENT
Start: 2023-12-04 | End: 2023-12-04

## 2023-12-04 RX ORDER — ENOXAPARIN SODIUM 100 MG/ML
40 INJECTION SUBCUTANEOUS
Status: DISCONTINUED | OUTPATIENT
Start: 2023-12-04 | End: 2023-12-06 | Stop reason: HOSPADM

## 2023-12-04 RX ADMIN — KETOROLAC TROMETHAMINE 30 MG: 30 INJECTION, SOLUTION INTRAMUSCULAR; INTRAVENOUS at 10:12

## 2023-12-04 RX ADMIN — FENTANYL CITRATE 25 MCG: 50 INJECTION, SOLUTION INTRAMUSCULAR; INTRAVENOUS at 07:12

## 2023-12-04 RX ADMIN — DOCUSATE SODIUM 200 MG: 100 CAPSULE, LIQUID FILLED ORAL at 09:12

## 2023-12-04 RX ADMIN — ENOXAPARIN SODIUM 40 MG: 40 INJECTION SUBCUTANEOUS at 09:12

## 2023-12-04 RX ADMIN — ACETAMINOPHEN 650 MG: 325 TABLET ORAL at 11:12

## 2023-12-04 RX ADMIN — BUPIVACAINE HYDROCHLORIDE IN DEXTROSE 1.6 ML: 7.5 INJECTION, SOLUTION SUBARACHNOID at 07:12

## 2023-12-04 RX ADMIN — DOCUSATE SODIUM 200 MG: 100 CAPSULE, LIQUID FILLED ORAL at 10:12

## 2023-12-04 RX ADMIN — ONDANSETRON 4 MG: 2 INJECTION INTRAMUSCULAR; INTRAVENOUS at 07:12

## 2023-12-04 RX ADMIN — ACETAMINOPHEN 650 MG: 325 TABLET ORAL at 05:12

## 2023-12-04 RX ADMIN — FENTANYL CITRATE 75 MCG: 50 INJECTION, SOLUTION INTRAMUSCULAR; INTRAVENOUS at 07:12

## 2023-12-04 RX ADMIN — ACETAMINOPHEN 1000 MG: 500 TABLET ORAL at 05:12

## 2023-12-04 RX ADMIN — KETOROLAC TROMETHAMINE 30 MG: 30 INJECTION, SOLUTION INTRAMUSCULAR; INTRAVENOUS at 09:12

## 2023-12-04 RX ADMIN — PRENATAL VIT W/ FE FUMARATE-FA TAB 27-0.8 MG 1 TABLET: 27-0.8 TAB at 10:12

## 2023-12-04 RX ADMIN — OXYTOCIN 30 UNITS: 10 INJECTION INTRAVENOUS at 08:12

## 2023-12-04 RX ADMIN — FAMOTIDINE 20 MG: 10 INJECTION INTRAVENOUS at 05:12

## 2023-12-04 RX ADMIN — SODIUM CITRATE AND CITRIC ACID MONOHYDRATE 30 ML: 500; 334 SOLUTION ORAL at 05:12

## 2023-12-04 RX ADMIN — SODIUM CHLORIDE, POTASSIUM CHLORIDE, SODIUM LACTATE AND CALCIUM CHLORIDE 500 ML: 600; 310; 30; 20 INJECTION, SOLUTION INTRAVENOUS at 02:12

## 2023-12-04 RX ADMIN — SODIUM CHLORIDE, POTASSIUM CHLORIDE, SODIUM LACTATE AND CALCIUM CHLORIDE: 600; 310; 30; 20 INJECTION, SOLUTION INTRAVENOUS at 07:12

## 2023-12-04 RX ADMIN — CEFAZOLIN 2 G: 2 INJECTION, POWDER, FOR SOLUTION INTRAMUSCULAR; INTRAVENOUS at 07:12

## 2023-12-04 RX ADMIN — SODIUM CHLORIDE, POTASSIUM CHLORIDE, SODIUM LACTATE AND CALCIUM CHLORIDE: 600; 310; 30; 20 INJECTION, SOLUTION INTRAVENOUS at 05:12

## 2023-12-04 RX ADMIN — KETOROLAC TROMETHAMINE 30 MG: 30 INJECTION, SOLUTION INTRAMUSCULAR; INTRAVENOUS at 03:12

## 2023-12-04 NOTE — PLAN OF CARE
Mother bf without assistance, she is experienced and confident. Basics / goals reviewed, see lactation note.

## 2023-12-04 NOTE — LACTATION NOTE
12/04/23 0855   Maternal Assessment   Breast Size Issue none   Breast Shape angled;pendulous;round   Breast Density Bilateral:;soft   Areola Bilateral:;elastic   Nipples Bilateral:;everted   Maternal Infant Feeding   Maternal Emotional State relaxed;independent   Infant Positioning clutch/football   Signs of Milk Transfer audible swallow;infant jaw motion present;suck/swallow ratio   Pain with Feeding no   Equipment Type   Breast Pump Type double electric, personal  (Mother reports having pump at home)   Community Referrals   Community Referrals outpatient lactation program;pediatric care provider;support group   Outpatient Lactation Program Lactation Follow-up Date/Time as needed / desired   Pediatric Care Provider Lactation Follow-up Date/Time as scheduled / needed   Support Group Lactation Follow-up Date/Time if desired     Upon entering room infant actively bf R breast in football hold. Mother reports that bf is going well, she bf previous 2 children without any problems. Mother has double electric breast pump at home. Infant note with wide gape, flanged lips and strong rhythmic pulls with audible swallows. Mother reports comfort with latch. Basics reviewed.     Basic Breastfeeding Instructions    The more you nurse the baby the more milk you will make.  Avoid bottles and pacifiers for the first 4 weeks.  Feed your baby only breastmik for the first 6 months.  Feed your baby at the earliest sign of hunger or comfort:  Sucking on fingers or hands  Bringing hands toward his mouth  Rooting or reaching for something to suck on  Sucking motions with mouth  Fretful noises  Crying is a late sign of hunger or comfort.  The baby should be positioned and latched on to the breast correctly  Chest-to-chest, chin in the breast  Babys lips are flipped outward  Babys mouth is stretched open wide like a shout  Babys sucking should feel like tugging to the mother  - The baby should be drinking at the breast  You should  hear an occasional swallow during the feeding  Switch breasts when the baby takes himself off the breast or falls asleep  Keep offering breasts until the baby looks full, no longer gives hunger signs, and stays asleep when placed on his back in the crib  - If the baby is sleepy and wont wake for a feeding, put the baby skin-to-skin dressed in a diaper against the mothers bare chest  - Sleep with your baby near you in the hospital room  - Call the nurse for additional assistance as needed.    Encouraged to allow infant to bf as long as desired, continue feeding with cues ensuring 8 or more in 24, waking as / if needed. Reviewed phases of milk, supply and goals. Mother denies questions or needs at this time, encouraged to call with any needs, v/u.

## 2023-12-04 NOTE — INTERVAL H&P NOTE
The patient has been examined and the H&P has been reviewed:      I concur with the findings and no changes have occurred since H&P was written.      Patient cleared for Anesthesia: Spinal      Anesthesia/Surgery risks, benefits and alternative options discussed and understood by patient/family.      There are no hospital problems to display for this patient.

## 2023-12-04 NOTE — ANESTHESIA PREPROCEDURE EVALUATION
12/04/2023  Kate Gomez is a 32 y.o., female.    Anesthesia Evaluation    I have reviewed the Patient Summary Reports.     I have reviewed the Nursing Notes.    I have reviewed the Medications.     Review of Systems  Anesthesia Hx:  No problems with previous Anesthesia                Social:  Non-Smoker, No Alcohol Use       Hematology/Oncology:  Hematology Normal   Oncology Normal                                   EENT/Dental:  EENT/Dental Normal           Cardiovascular:  Cardiovascular Normal Exercise tolerance: good                                           Pulmonary:  Pulmonary Normal                       Renal/:  Renal/ Normal                 Hepatic/GI:  Hepatic/GI Normal                 Musculoskeletal:  Musculoskeletal Normal                Neurological:  Neurology Normal                                      Endocrine:  Endocrine Normal            Dermatological:  Skin Normal    Psych:  Psychiatric Normal                    Physical Exam  General:  Obesity       Airway/Jaw/Neck:  Airway Findings: Mouth Opening: Normal     Tongue: Normal      General Airway Assessment: Adult      Mallampati: III   TM Distance: Normal, at least 6 cm               Dental:  Dental Findings: In tact                Anesthesia Plan  Type of Anesthesia, risks & benefits discussed:  Anesthesia Type:  spinal, general    Patient's Preference:   Plan Factors:          Intra-op Monitoring Plan:   Intra-op Monitoring Plan Comments:   Post Op Pain Control Plan: multimodal analgesia  Post Op Pain Control Plan Comments:     Induction:   IV  Beta Blocker:  Patient is not currently on a Beta-Blocker (No further documentation required).       Informed Consent: Informed consent signed with the Patient and all parties understand the risks and agree with anesthesia plan.  All questions answered.  Anesthesia consent signed with  patient.  ASA Score: 2     Day of Surgery Review of History & Physical: I have interviewed and examined the patient. I have reviewed the patient's H&P dated: 12/4/23.  There are no significant changes.  H&P Update referred to the surgeon/provider.          Ready For Surgery From Anesthesia Perspective.             Physical Exam  General: Obesity    Airway:  Mallampati: III   Mouth Opening: Normal  TM Distance: Normal, at least 6 cm  Tongue: Normal    Dental:  In tact        Anesthesia Plan  Type of Anesthesia, risks & benefits discussed:    Anesthesia Type: spinal, general  Post Op Pain Control Plan: multimodal analgesia  Induction:  IV  Informed Consent: Informed consent signed with the Patient and all parties understand the risks and agree with anesthesia plan.  All questions answered.   ASA Score: 2  Day of Surgery Review of History & Physical: H&P Update referred to the surgeon/provider.I have interviewed and examined the patient. I have reviewed the patient's H&P dated: 12/4/23. There are no significant changes.     Ready For Surgery From Anesthesia Perspective.     .

## 2023-12-04 NOTE — TRANSFER OF CARE
"Anesthesia Transfer of Care Note    Patient: Kate Gomez    Procedure(s) Performed: Procedure(s) (LRB):  REPEAT  SECTION (N/A)    Patient location: Labor and Delivery    Anesthesia Type: spinal    Transport from OR: Transported from OR on room air with adequate spontaneous ventilation    Post pain: adequate analgesia    Post assessment: no apparent anesthetic complications and tolerated procedure well    Post vital signs: stable    Level of consciousness: awake and alert    Nausea/Vomiting: no nausea/vomiting    Complications: none    Transfer of care protocol was followed      Last vitals: Visit Vitals  BP (!) 141/73 (BP Location: Left forearm, Patient Position: Sitting)   Pulse 75   Temp 36.2 °C (97.1 °F) (Skin)   Resp 16   Ht 5' 1" (1.549 m)   Wt 109.3 kg (241 lb)   LMP 2023   SpO2 97%   Breastfeeding No   BMI 45.54 kg/m²     "

## 2023-12-04 NOTE — NURSING
Patient requesting piece of umbilical cord to bring home to plant. Patient signed release of placenta form; her mother in law will bring the piece of cord home and plant it right away the patient states.

## 2023-12-04 NOTE — NURSING
Contacted Dr. Greenfield concerning urine output since  this morning. MD states to give patient 500ml bolus LR to increase urine output.

## 2023-12-04 NOTE — OP NOTE
Preop diagnosis: Previous  section    Postop diagnosis: same    Procedure repeat low transverse  via Pfannenstiel incision    Date 2023    Surgeon Dr. Isael Greenfield    Asst. Surgeon(s):  Isael Greenfield MD Reynolds, Kellin K., MD    Estimated blood loss 450 cc    Anesthesia Spinal    Weeks of gestation 39    Findings male infant    Procedure in detail    After the appropriate informed consents were obtained and appropriate lab work found to be within normal limits, the patient was taken to the operating room, where Spinal was found to be adequate.  She was prepped and draped in the usual sterile fashion.  A Pfannenstiel was made through her previous incision.  It was carried down to the underlying tissue with a Bovie, to the fascia which was nicked in the midline and extended bilaterally with curved Spencer scissors.  The rectus muscles was dissected off, first superiorly, and then inferiorly.  The rectus muscle was  in the midline.  The peritoneum was identified elevated and entered sharply.  There were numerous adhesions and the peritoneal space was very difficult to access The incision was extended inferiorly with good position of the bladder.  The bladder blade was placed.  The vesicouterine peritoneum was then identified and entered sharply and a bladder flap was created.  The bladder blade was replaced.  A transverse incision was made in the lower uterine segment.  The infant's head was delivered without difficulty.  Mouth and nose were bulb suctioned.  The body was delivered, the cord was doubly clamped and cut.  Infant was handed off to waiting pediatrician, Pediatric nurses, with a spontaneous cry.  Cord blood was obtained.  The placenta was delivered manually.  Uterus was externalized from the abdomen was cleared of all clots and debris using moist laparotomy sponges.  Uterine incision was repaired using 0 PDS, first a running locked fashion.  Second layer the same suture  was used in imbricating fashion to obtain excellent hemostasis.  Bladder flap was repaired with 2-0 Vicryl in running fashion.  Uterus was returned to the abdomen and gutters were cleared of all clots and debris after irrigated with warm normal saline solution.  Peritoneum was closed with 2-0 Vicryl in running fashion.  Muscles were reapproximated with interrupteds of 2-0 chromic.  The fascia was closed with 0 Vicryl in a running fashion.  .  A subcutaneous stitch of 3-0 undyed Vicryl was placed.  Staples placed in the skin and the wound was dressed.  The patient was sent to recovery room in stable condition.

## 2023-12-04 NOTE — ANESTHESIA PREPROCEDURE EVALUATION
12/04/2023  Kate Gomez is a 32 y.o., female.    Pre-op Assessment    I have reviewed the Patient Summary Reports.     I have reviewed the Nursing Notes.    I have reviewed the Medications.     Review of Systems  Anesthesia Hx:  No problems with previous Anesthesia                Social:  Non-Smoker, No Alcohol Use       Hematology/Oncology:  Hematology Normal   Oncology Normal                                   EENT/Dental:  EENT/Dental Normal           Cardiovascular:  Cardiovascular Normal Exercise tolerance: good                                           Pulmonary:  Pulmonary Normal                       Renal/:  Renal/ Normal                 Hepatic/GI:  Hepatic/GI Normal                 Musculoskeletal:  Musculoskeletal Normal                Neurological:  Neurology Normal                                      Endocrine:  Endocrine Normal            Dermatological:  Skin Normal    Psych:  Psychiatric Normal                    Physical Exam  General:  Obesity       Airway/Jaw/Neck:  Airway Findings: Mouth Opening: Normal     Tongue: Normal      General Airway Assessment: Adult      Mallampati: III   TM Distance: Normal, at least 6 cm               Dental:  Dental Findings: In tact                Anesthesia Plan  Type of Anesthesia, risks & benefits discussed:  Anesthesia Type:  spinal    Patient's Preference:   Plan Factors:          Intra-op Monitoring Plan:   Intra-op Monitoring Plan Comments:   Post Op Pain Control Plan:   Post Op Pain Control Plan Comments:     Induction:   IV  Beta Blocker:  Patient is not currently on a Beta-Blocker (No further documentation required).       Informed Consent: Informed consent signed with the Patient and all parties understand the risks and agree with anesthesia plan.  All questions answered.  Anesthesia consent signed with patient.  ASA Score: 2     Day of  Surgery Review of History & Physical: I have interviewed and examined the patient. I have reviewed the patient's H&P dated: 12/04/2023.  There are no significant changes.                    Physical Exam  General: Obesity    Airway:  Mallampati: III   Mouth Opening: Normal  TM Distance: Normal, at least 6 cm  Tongue: Normal    Dental:  In tact        Anesthesia Plan  Type of Anesthesia, risks & benefits discussed:    Anesthesia Type: spinal  Induction:  IV  Informed Consent: Informed consent signed with the Patient and all parties understand the risks and agree with anesthesia plan.  All questions answered.   ASA Score: 2  Day of Surgery Review of History & Physical: I have interviewed and examined the patient. I have reviewed the patient's H&P dated: 12/04/2023.     .

## 2023-12-04 NOTE — ANESTHESIA POSTPROCEDURE EVALUATION
Anesthesia Post Evaluation    Patient: Kate Gomez    Procedure(s) Performed: Procedure(s) (LRB):  REPEAT  SECTION (N/A)    Final Anesthesia Type: epidural      Patient location during evaluation: labor & delivery  Patient participation: Yes- Able to Participate  Level of consciousness: awake and alert and oriented  Post-procedure vital signs: reviewed and stable  Pain management: adequate  Airway patency: patent    PONV status at discharge: No PONV  Anesthetic complications: no      Cardiovascular status: blood pressure returned to baseline  Respiratory status: unassisted, spontaneous ventilation and room air  Hydration status: euvolemic  Follow-up not needed.              Vitals Value Taken Time   /73 23 0840   Temp 36.2 °C (97.1 °F) 23 0840   Pulse 88 23 0849   Resp 16 23 0840   SpO2 100 % 23 0849   Vitals shown include unvalidated device data.      No case tracking events are documented in the log.      Pain/Bettye Score: Pain Rating Prior to Med Admin: 0 (2023  5:24 AM)

## 2023-12-05 LAB
BASOPHILS # BLD AUTO: 0.01 K/UL (ref 0–0.2)
BASOPHILS NFR BLD: 0.2 % (ref 0–1.9)
DIFFERENTIAL METHOD: ABNORMAL
EOSINOPHIL # BLD AUTO: 0 K/UL (ref 0–0.5)
EOSINOPHIL NFR BLD: 0.5 % (ref 0–8)
ERYTHROCYTE [DISTWIDTH] IN BLOOD BY AUTOMATED COUNT: 16.6 % (ref 11.5–14.5)
HCT VFR BLD AUTO: 21 % (ref 37–48.5)
HGB BLD-MCNC: 6.2 G/DL (ref 12–16)
IMM GRANULOCYTES # BLD AUTO: 0.02 K/UL (ref 0–0.04)
IMM GRANULOCYTES NFR BLD AUTO: 0.4 % (ref 0–0.5)
LYMPHOCYTES # BLD AUTO: 1.3 K/UL (ref 1–4.8)
LYMPHOCYTES NFR BLD: 23.5 % (ref 18–48)
MCH RBC QN AUTO: 21.2 PG (ref 27–31)
MCHC RBC AUTO-ENTMCNC: 29.5 G/DL (ref 32–36)
MCV RBC AUTO: 72 FL (ref 82–98)
MONOCYTES # BLD AUTO: 0.3 K/UL (ref 0.3–1)
MONOCYTES NFR BLD: 4.7 % (ref 4–15)
NEUTROPHILS # BLD AUTO: 3.9 K/UL (ref 1.8–7.7)
NEUTROPHILS NFR BLD: 70.7 % (ref 38–73)
NRBC BLD-RTO: 0 /100 WBC
PLATELET # BLD AUTO: 150 K/UL (ref 150–450)
PMV BLD AUTO: 10.4 FL (ref 9.2–12.9)
RBC # BLD AUTO: 2.92 M/UL (ref 4–5.4)
WBC # BLD AUTO: 5.53 K/UL (ref 3.9–12.7)

## 2023-12-05 PROCEDURE — 85025 COMPLETE CBC W/AUTO DIFF WBC: CPT | Performed by: OBSTETRICS & GYNECOLOGY

## 2023-12-05 PROCEDURE — 99232 SBSQ HOSP IP/OBS MODERATE 35: CPT | Mod: 95,,, | Performed by: OBSTETRICS & GYNECOLOGY

## 2023-12-05 PROCEDURE — 36415 COLL VENOUS BLD VENIPUNCTURE: CPT | Performed by: OBSTETRICS & GYNECOLOGY

## 2023-12-05 PROCEDURE — 11000001 HC ACUTE MED/SURG PRIVATE ROOM

## 2023-12-05 PROCEDURE — 63600175 PHARM REV CODE 636 W HCPCS: Performed by: OBSTETRICS & GYNECOLOGY

## 2023-12-05 PROCEDURE — 25000003 PHARM REV CODE 250: Performed by: OBSTETRICS & GYNECOLOGY

## 2023-12-05 PROCEDURE — 99232 PR SUBSEQUENT HOSPITAL CARE,LEVL II: ICD-10-PCS | Mod: 95,,, | Performed by: OBSTETRICS & GYNECOLOGY

## 2023-12-05 RX ADMIN — PRENATAL VIT W/ FE FUMARATE-FA TAB 27-0.8 MG 1 TABLET: 27-0.8 TAB at 09:12

## 2023-12-05 RX ADMIN — IBUPROFEN 800 MG: 800 TABLET, FILM COATED ORAL at 09:12

## 2023-12-05 RX ADMIN — ACETAMINOPHEN 650 MG: 325 TABLET ORAL at 06:12

## 2023-12-05 RX ADMIN — DOCUSATE SODIUM 200 MG: 100 CAPSULE, LIQUID FILLED ORAL at 08:12

## 2023-12-05 RX ADMIN — ACETAMINOPHEN 650 MG: 325 TABLET ORAL at 12:12

## 2023-12-05 RX ADMIN — ENOXAPARIN SODIUM 40 MG: 40 INJECTION SUBCUTANEOUS at 08:12

## 2023-12-05 RX ADMIN — KETOROLAC TROMETHAMINE 30 MG: 30 INJECTION, SOLUTION INTRAMUSCULAR; INTRAVENOUS at 03:12

## 2023-12-05 RX ADMIN — IBUPROFEN 800 MG: 800 TABLET, FILM COATED ORAL at 05:12

## 2023-12-05 RX ADMIN — DOCUSATE SODIUM 200 MG: 100 CAPSULE, LIQUID FILLED ORAL at 09:12

## 2023-12-05 RX ADMIN — ACETAMINOPHEN 650 MG: 325 TABLET ORAL at 05:12

## 2023-12-05 RX ADMIN — ENOXAPARIN SODIUM 40 MG: 40 INJECTION SUBCUTANEOUS at 09:12

## 2023-12-05 NOTE — SUBJECTIVE & OBJECTIVE
Interval History:     She is doing well this morning. She is tolerating a regular diet without nausea or vomiting. She is voiding spontaneously. She is ambulating. She has passed flatus, and has not a BM. Vaginal bleeding is mild. She denies fever or chills. Abdominal pain is mild and controlled with oral medications. She Is breastfeeding. She desires circumcision for her male baby: yes.    Objective:     Vital Signs (Most Recent):  Temp: 97 °F (36.1 °C) (12/05/23 0335)  Pulse: 86 (12/05/23 0330)  Resp: 16 (12/05/23 0330)  BP: 110/60 (12/05/23 0330)  SpO2: 98 % (12/04/23 2025) Vital Signs (24h Range):  Temp:  [97 °F (36.1 °C)-98.1 °F (36.7 °C)] 97 °F (36.1 °C)  Pulse:  [] 86  Resp:  [16-20] 16  SpO2:  [98 %-100 %] 98 %  BP: (110-137)/(60-88) 110/60     Weight: 109.3 kg (241 lb)  Body mass index is 45.54 kg/m².      Intake/Output Summary (Last 24 hours) at 12/5/2023 0917  Last data filed at 12/4/2023 1831  Gross per 24 hour   Intake --   Output 1050 ml   Net -1050 ml         Significant Labs:  Lab Results   Component Value Date    GROUPTRH B POS 12/01/2023    HEPBSAG Non-reactive 03/31/2023    STREPBCULT No Group B Streptococcus isolated 11/10/2023     Recent Labs   Lab 12/05/23  0611   HGB 6.2*   HCT 21.0*       I have personallly reviewed all pertinent lab results from the last 24 hours.    Physical Exam:   Constitutional: She appears well-developed and well-nourished.       Cardiovascular:  Normal rate, regular rhythm and normal heart sounds.             Pulmonary/Chest: Effort normal and breath sounds normal.        Abdominal: Soft.                       Review of Systems

## 2023-12-05 NOTE — PROGRESS NOTES
[facility] - Labor & Delivery  Obstetrics  Postpartum Progress Note    Patient Name: Kate Gomez  MRN: [id number]  Admission Date: 2023  Hospital Length of Stay: 1 days  Attending Physician: Isael Greenfield MD  Primary Care Provider: Rae, Primary Doctor    Subjective:     Principal Problem:Previous  section    Hospital Course:  Partum day 1. Patient doing well.  Continue routine postoperative care    Interval History:     She is doing well this morning. She is tolerating a regular diet without nausea or vomiting. She is voiding spontaneously. She is ambulating. She has passed flatus, and has not a BM. Vaginal bleeding is mild. She denies fever or chills. Abdominal pain is mild and controlled with oral medications. She Is breastfeeding. She desires circumcision for her male baby: yes.    Objective:     Vital Signs (Most Recent):  Temp: 97 °F (36.1 °C) (23)  Pulse: 86 (23)  Resp: 16 (23)  BP: 110/60 (23)  SpO2: 98 % (23) Vital Signs (24h Range):  Temp:  [97 °F (36.1 °C)-98.1 °F (36.7 °C)] 97 °F (36.1 °C)  Pulse:  [] 86  Resp:  [16-20] 16  SpO2:  [98 %-100 %] 98 %  BP: (110-137)/(60-88) 110/60     Weight: 109.3 kg (241 lb)  Body mass index is 45.54 kg/m².      Intake/Output Summary (Last 24 hours) at 2023 0917  Last data filed at 2023 1831  Gross per 24 hour   Intake --   Output 1050 ml   Net -1050 ml         Significant Labs:  Lab Results   Component Value Date    GROUPTRH B POS 2023    HEPBSAG Non-reactive 2023    STREPBCULT No Group B Streptococcus isolated 11/10/2023     Recent Labs   Lab 23  0611   HGB 6.2*   HCT 21.0*       I have personallly reviewed all pertinent lab results from the last 24 hours.    Physical Exam:   Constitutional: She appears well-developed and well-nourished.       Cardiovascular:  Normal rate, regular rhythm and normal heart sounds.             Pulmonary/Chest: Effort normal and breath  sounds normal.        Abdominal: Soft.                       Review of Systems  Assessment/Plan:     32 y.o. female  for:    * Previous  section  Postpartum day 1. Patient doing well        Disposition: As patient meets milestones, will plan to discharge possibly tomorrow or Thursday.    Isael Greenfield MD  Obstetrics  [facility] - Labor & Delivery

## 2023-12-05 NOTE — HOSPITAL COURSE
PostPartum day 1. Patient doing well.  Continue routine postoperative care  POD#2: continue routine care; meeting all milestones and will be discharged to home

## 2023-12-05 NOTE — PLAN OF CARE
Pt stable. Vital signs WNL. Tolerating regular diet well. Awaiting first void post catheter removal. Fundus firm without massage, @ umbilicus, midline, light  bleeding. Pain adequately controlled with po tylenol and IV toradol. S/O at bedside, supportive of and attentive to pt and infant, bonding well with pt and infant. Pt bonding well with infant and responding to infant cues.

## 2023-12-05 NOTE — PLAN OF CARE
Stable shift. Pt tolerated all meds and procedures well. V/S stable. NAD noted. See flow sheets for details. C/O incisional and tailbone pain w/ relief from scheduled meds. See MAR for details. Pt up to bathroom w/ assistance from spouse. Plan of care reviewed w/ pt; pt states understanding.  Pt attentive and appropriate w/ infant during shift. Pt BF infant w/ no assistance needed. Encouraged pt to alternate breasts when feeding and offer L side; pt states understanding.  Reinforced benefits of skin to skin at birth and throughout hospital stay.  Questions/ Concerns answered, Mother verbalizes understanding.    Used Breastfeeding Guide and reviewed first alert form, importance/ benefits of exclusive breastfeeding for 6 months, proper handling and storage of breast milk, and all resources available after leaving the hospital. Questions/ Concerns answered. Mother verbalized understanding.   Safe sleep discussed w/ pt; pt states understanding.

## 2023-12-05 NOTE — PLAN OF CARE
Vitals WDL.Breastfeeding on demand per baby cues 8 or more time in 24 hrsh. Education provided on latch, positioning,milk transfer and importance of baby led feeding on cue (8 or more times daily) and use of hand expression. LATCH score complete. Reviewed the risk associated with use of pacifiers and reasons to avoid artificial nipples. Encouraged mother to use Breastfeeding Guide to track feedings and output. Questions/ Concerns answered. Mother verbalizes understanding.  Eating and drinking well Voiding well. Ambulating well without any help.Doesn't require much breastfeeding support.Low transverse aquacel dressing dry and intact but sticking to lower abdomen

## 2023-12-05 NOTE — PLAN OF CARE
[facility] - Labor & Delivery  Discharge Assessment    Primary Care Provider: No, Primary Doctor     OB Screen (most recent)       OB Screen - 23 0842          OB SCREEN    Assessment Type Discharge Planning Brief Assessment (P)      Source of Information health record (P)      Received Prenatal Care Yes (P)      Any indications/suspicions for None (P)      Is this a teen pregnancy No (P)      Is the baby in NICU No (P)      Indication for adoption/Safe Haven No (P)      Indication for DME/post-acute needs No (P)      HIV (+) No (P)      Any congenital  disorders No (P)      Fetal demise/ death No (P)                        This patient has been screened for Case Management needs.  Based on documentation in medical record, no needs are anticipated at this time. Case Management/Social Work remains available if a need arises, please enter consult for assistance.  For urgent needs contact Case Management Department/on-call at:  [phone].

## 2023-12-06 VITALS
DIASTOLIC BLOOD PRESSURE: 73 MMHG | HEIGHT: 61 IN | WEIGHT: 241 LBS | HEART RATE: 82 BPM | BODY MASS INDEX: 45.5 KG/M2 | OXYGEN SATURATION: 99 % | TEMPERATURE: 97 F | RESPIRATION RATE: 18 BRPM | SYSTOLIC BLOOD PRESSURE: 122 MMHG

## 2023-12-06 PROCEDURE — 99238 PR HOSPITAL DISCHARGE DAY,<30 MIN: ICD-10-PCS | Mod: ,,, | Performed by: STUDENT IN AN ORGANIZED HEALTH CARE EDUCATION/TRAINING PROGRAM

## 2023-12-06 PROCEDURE — 99238 HOSP IP/OBS DSCHRG MGMT 30/<: CPT | Mod: ,,, | Performed by: STUDENT IN AN ORGANIZED HEALTH CARE EDUCATION/TRAINING PROGRAM

## 2023-12-06 PROCEDURE — 63600175 PHARM REV CODE 636 W HCPCS: Performed by: OBSTETRICS & GYNECOLOGY

## 2023-12-06 PROCEDURE — 25000003 PHARM REV CODE 250: Performed by: OBSTETRICS & GYNECOLOGY

## 2023-12-06 RX ORDER — IBUPROFEN 800 MG/1
800 TABLET ORAL EVERY 8 HOURS
Qty: 21 TABLET | Refills: 0 | Status: SHIPPED | OUTPATIENT
Start: 2023-12-06 | End: 2023-12-13

## 2023-12-06 RX ORDER — OXYCODONE HYDROCHLORIDE 5 MG/1
5 TABLET ORAL EVERY 6 HOURS PRN
Qty: 20 TABLET | Refills: 0 | Status: SHIPPED | OUTPATIENT
Start: 2023-12-06 | End: 2023-12-11

## 2023-12-06 RX ADMIN — ENOXAPARIN SODIUM 40 MG: 40 INJECTION SUBCUTANEOUS at 09:12

## 2023-12-06 RX ADMIN — IBUPROFEN 800 MG: 800 TABLET, FILM COATED ORAL at 09:12

## 2023-12-06 RX ADMIN — IBUPROFEN 800 MG: 800 TABLET, FILM COATED ORAL at 12:12

## 2023-12-06 RX ADMIN — ACETAMINOPHEN 650 MG: 325 TABLET ORAL at 05:12

## 2023-12-06 RX ADMIN — ACETAMINOPHEN 650 MG: 325 TABLET ORAL at 12:12

## 2023-12-06 RX ADMIN — DOCUSATE SODIUM 200 MG: 100 CAPSULE, LIQUID FILLED ORAL at 09:12

## 2023-12-06 RX ADMIN — PRENATAL VIT W/ FE FUMARATE-FA TAB 27-0.8 MG 1 TABLET: 27-0.8 TAB at 09:12

## 2023-12-06 NOTE — DISCHARGE INSTRUCTIONS
Postpartum Discharge Instructions:    · No heavy lifting, straining, take frequent rest periods  · Pelvic rest--no douching, tampons, or intercourse until released by MD  · Talk to your doctor about birth control--remember breastfeeding is not a method of birth control  · Notify MD if bleeding becomes heavier than usual and if large clots, painful cramping,or foul odor develops.   Vaginal discharge will lighten and decrease in amount gradually.    · Cleanse perineal area from front to back after urination or having a bowel movement.  · Tub soak or portable sitz bath at home.  Apply clean pad with Dermaplast and Tucks to perineal area.  · Episiotomy stitches will dissolve within 2-3 weeks  · If not breastfeeding, wear tight fitting sports bra for 1 week--remove only to bathe  · Remember to keep your breast clean and dry to prevent any cracking  · Notify MD if breast become reddened,swollen, nipples bleed or crack, or fever greater than 100.4  · Notify MD of pain, swelling,  Redness, or heat developing in back of leg especially when foot is flexed toward body  · Look at incision everyday for redness, swelling, or drainage which may indicate infection  · Notify MD of pain not relieved by pain medication.  · Call MD or go to ER for any concerns

## 2023-12-06 NOTE — PROGRESS NOTES
[facility] - Labor & Delivery  Obstetrics  Postpartum Progress Note    Patient Name: Kate Gomez  MRN: [id number]  Admission Date: 2023  Hospital Length of Stay: 2 days  Attending Physician: Isael Greenfield MD  Primary Care Provider: Rae, Primary Doctor    Subjective:     Principal Problem:Previous  section    Hospital Course:  PostPartum day 1. Patient doing well.  Continue routine postoperative care  POD#2: continue routine care; meeting all milestones and will be discharged to home     Interval History: POD#2    She is doing well this morning. She is tolerating a regular diet without nausea or vomiting. She is voiding spontaneously. She is ambulating. She has passed flatus, and has not had a BM. Vaginal bleeding is mild. She denies fever or chills. Abdominal pain is mild and controlled with oral medications. She Is breastfeeding. She desires circumcision for her male baby: yes.    Objective:     Vital Signs (Most Recent):  Temp: 97.2 °F (36.2 °C) (23)  Pulse: 107 (23)  Resp: 18 (23)  BP: (!) 114/56 (23)  SpO2: 99 % (23) Vital Signs (24h Range):  Temp:  [97 °F (36.1 °C)-98.1 °F (36.7 °C)] 97.2 °F (36.2 °C)  Pulse:  [] 107  Resp:  [16-18] 18  SpO2:  [98 %-99 %] 99 %  BP: ()/(56-75) 114/56     Weight: 109.3 kg (241 lb)  Body mass index is 45.54 kg/m².    No intake or output data in the 24 hours ending 23 0959      Significant Labs:  Lab Results   Component Value Date    GROUPTRH B POS 2023    HEPBSAG Non-reactive 2023    STREPBCULT No Group B Streptococcus isolated 11/10/2023     Recent Labs   Lab 23  0611   HGB 6.2*   HCT 21.0*       None    Physical Exam:   Constitutional: She is oriented to person, place, and time. She appears well-developed and well-nourished. No distress.    HENT:   Head: Normocephalic and atraumatic.    Eyes: Pupils are equal, round, and reactive to light. EOM are normal.     Cardiovascular:   Normal rate.             Pulmonary/Chest: Effort normal. No respiratory distress.        Abdominal: Soft. She exhibits abdominal incision (c/d/i with bandage in place). She exhibits no distension. There is abdominal tenderness.             Musculoskeletal: Normal range of motion and moves all extremeties.       Neurological: She is alert and oriented to person, place, and time.    Skin: Skin is warm and dry. No erythema.    Psychiatric: She has a normal mood and affect. Her behavior is normal. Judgment and thought content normal.       Review of Systems   Constitutional:  Negative for appetite change, chills and fever.   Eyes:  Negative for visual disturbance.   Respiratory:  Negative for shortness of breath.    Cardiovascular:  Negative for chest pain.   Gastrointestinal:  Positive for abdominal pain. Negative for constipation, diarrhea, nausea and vomiting.   Genitourinary:  Positive for vaginal bleeding. Negative for vaginal discharge and vaginal odor.   Musculoskeletal:  Negative for back pain.   Neurological:  Negative for syncope and headaches.   Psychiatric/Behavioral:  Negative for depression. The patient is not nervous/anxious.      Assessment/Plan:     33 y.o. female  for:    * Previous  section  - continue routine post-op care; discharge to home         Disposition: As patient meets milestones, will plan to discharge to home today.    Aleta Marin MD  Obstetrics  [place] [facility] - Labor & Delivery

## 2023-12-06 NOTE — SUBJECTIVE & OBJECTIVE
Interval History: POD#2    She is doing well this morning. She is tolerating a regular diet without nausea or vomiting. She is voiding spontaneously. She is ambulating. She has passed flatus, and has not had a BM. Vaginal bleeding is mild. She denies fever or chills. Abdominal pain is mild and controlled with oral medications. She Is breastfeeding. She desires circumcision for her male baby: yes.    Objective:     Vital Signs (Most Recent):  Temp: 97.2 °F (36.2 °C) (12/06/23 0003)  Pulse: 107 (12/06/23 0003)  Resp: 18 (12/06/23 0003)  BP: (!) 114/56 (12/06/23 0003)  SpO2: 99 % (12/06/23 0003) Vital Signs (24h Range):  Temp:  [97 °F (36.1 °C)-98.1 °F (36.7 °C)] 97.2 °F (36.2 °C)  Pulse:  [] 107  Resp:  [16-18] 18  SpO2:  [98 %-99 %] 99 %  BP: ()/(56-75) 114/56     Weight: 109.3 kg (241 lb)  Body mass index is 45.54 kg/m².    No intake or output data in the 24 hours ending 12/06/23 0959      Significant Labs:  Lab Results   Component Value Date    GROUPTRH B POS 12/01/2023    HEPBSAG Non-reactive 03/31/2023    STREPBCULT No Group B Streptococcus isolated 11/10/2023     Recent Labs   Lab 12/05/23  0611   HGB 6.2*   HCT 21.0*       None    Physical Exam:   Constitutional: She is oriented to person, place, and time. She appears well-developed and well-nourished. No distress.    HENT:   Head: Normocephalic and atraumatic.    Eyes: Pupils are equal, round, and reactive to light. EOM are normal.     Cardiovascular:  Normal rate.             Pulmonary/Chest: Effort normal. No respiratory distress.        Abdominal: Soft. She exhibits abdominal incision (c/d/i with bandage in place). She exhibits no distension. There is abdominal tenderness.             Musculoskeletal: Normal range of motion and moves all extremeties.       Neurological: She is alert and oriented to person, place, and time.    Skin: Skin is warm and dry. No erythema.    Psychiatric: She has a normal mood and affect. Her behavior is normal.  Judgment and thought content normal.       Review of Systems   Constitutional:  Negative for appetite change, chills and fever.   Eyes:  Negative for visual disturbance.   Respiratory:  Negative for shortness of breath.    Cardiovascular:  Negative for chest pain.   Gastrointestinal:  Positive for abdominal pain. Negative for constipation, diarrhea, nausea and vomiting.   Genitourinary:  Positive for vaginal bleeding. Negative for vaginal discharge and vaginal odor.   Musculoskeletal:  Negative for back pain.   Neurological:  Negative for syncope and headaches.   Psychiatric/Behavioral:  Negative for depression. The patient is not nervous/anxious.

## 2023-12-06 NOTE — DISCHARGE SUMMARY
"[facility] - Labor & Delivery  Obstetrics  Discharge Summary      Patient Name: Kate Gomez  MRN: [id number]  Admission Date: 2023  Hospital Length of Stay: 2 days  Discharge Date and Time:  2023 10:03 AM  Attending Physician: Isael Greenfield MD   Discharging Provider: Aleta Marin MD   Primary Care Provider: No, Primary Doctor    HPI: No notes on file        Procedure(s) (LRB):  REPEAT  SECTION (N/A)     Hospital Course:   PostPartum day 1. Patient doing well.  Continue routine postoperative care  POD#2: continue routine care; meeting all milestones and will be discharged to home      Consults (From admission, onward)          Status Ordering Provider     Inpatient consult to Lactation  Once        Provider:  (Not yet assigned)    Acknowledged ISAEL GREENFIELD     Inpatient consult to Anesthesiology  Once        Provider:  (Not yet assigned)    Acknowledged ISAEL GREENFIELD            Final Active Diagnoses:    Diagnosis Date Noted POA    PRINCIPAL PROBLEM:  Previous  section [Z98.891] 2016 Not Applicable      Problems Resolved During this Admission:        Significant Diagnostic Studies: N/A      Feeding Method: breast    Immunizations       None            Delivery:    Episiotomy:     Lacerations:     Repair suture:     Repair # of packets:     Blood loss (ml):       Birth information:  YOB: 2022   Time of birth: 7:55 AM   Sex: male   Delivery type: , Low Transverse   Gestational Age: 39w1d     Measurements    Weight: 3572 g  Weight (lbs): 7 lb 14 oz  Length: 50.8 cm  Length (in): 20"  Head circumference: 36.8 cm  Chest circumference: 33 cm  Abdominal girth: 30.5 cm  Birth comments: S2S AT 15 MINUTES THEN BECAME DUSKY AND MOTTLEON MOM, TO WARM WITH +5 MASK CPAP AND FiO2 TO 30 then 40% THEN WEANED then back to S2S suctions thick         Delivery Clinician: Delivery Providers    Delivering clinician: Isael Greenfield MD          Additional "  information:  Forceps:    Vacuum:    Breech:    Observed anomalies S2S AT 15 MINUTES THEN BECAME DUSKY AND MOTTLEON MOM, TO WARM WITH +5 MASK CPAP AND FiO2 TO 30 then 40% THEN WEANED then back to S2S suctions thick     Living?:     Apgars    Living status: Living  Apgar Component Scores:  1 min.:  5 min.:  10 min.:  15 min.:  20 min.:    Skin color:  0  1       Heart rate:  2  2       Reflex irritability:  2  2       Muscle tone:  2  2       Respiratory effort:  2  2       Total:  8  9       Apgars assigned by: CHRISSY MUÑOZ RN         Placenta: Delivered:       appearance  Pending Diagnostic Studies:       None            Discharged Condition: good    Disposition: Home or Self Care    Follow Up:   Follow-up Information       Isael Greenfield MD Follow up in 1 week(s).    Specialties: Obstetrics, Obstetrics and Gynecology  Why: For wound re-check  Contact information:  [address] [id number]  [phone]                           Patient Instructions:      Pelvic Rest     Notify your health care provider if you experience any of the following:  temperature >100.4     Notify your health care provider if you experience any of the following:  persistent nausea and vomiting or diarrhea     Notify your health care provider if you experience any of the following:  severe uncontrolled pain     Notify your health care provider if you experience any of the following:  redness, tenderness, or signs of infection (pain, swelling, redness, odor or green/yellow discharge around incision site)     Notify your health care provider if you experience any of the following:  difficulty breathing or increased cough     Notify your health care provider if you experience any of the following:  severe persistent headache     Notify your health care provider if you experience any of the following:  worsening rash     Notify your health care provider if you experience any of the following:  persistent dizziness,  light-headedness, or visual disturbances     Notify your health care provider if you experience any of the following:  increased confusion or weakness     Medications:  Current Discharge Medication List        START taking these medications    Details   ibuprofen (ADVIL,MOTRIN) 800 MG tablet Take 1 tablet (800 mg total) by mouth every 8 (eight) hours. for 7 days  Qty: 21 tablet, Refills: 0      oxyCODONE (ROXICODONE) 5 MG immediate release tablet Take 1 tablet (5 mg total) by mouth every 6 (six) hours as needed for Pain.  Qty: 20 tablet, Refills: 0    Comments: Quantity prescribed more than 7 day supply? No           CONTINUE these medications which have NOT CHANGED    Details   aspirin (ECOTRIN) 81 MG EC tablet Take 1 tablet (81 mg total) by mouth once daily.  Refills: 0      fluticasone propionate (FLONASE) 50 mcg/actuation nasal spray 1 spray by Each Nostril route.      loratadine (CLARITIN) 10 mg tablet Take 10 mg by mouth.      prenatal vit/iron fum/folic ac (PRENATAL 1+1 ORAL) Take by mouth.             Aleta Marin MD  Obstetrics  [place] - Labor & Delivery

## 2023-12-06 NOTE — PLAN OF CARE
Patient stable, vitals WNL. Fundus midline, firm without massage, 2cm below umbilicus, light bleeding. Patient voiding spontaneously and without difficulty. Patient's pain controlled with scheduled around the clock medications. Significant other at bedside attentive to both patient and infant. Patient bonding well with infant. Pt. Continues to breastfeed. Patient responding to infant cues.

## 2023-12-11 ENCOUNTER — POSTPARTUM VISIT (OUTPATIENT)
Dept: OBSTETRICS AND GYNECOLOGY | Facility: CLINIC | Age: 33
End: 2023-12-11
Payer: MEDICAID

## 2023-12-11 VITALS
HEART RATE: 98 BPM | HEIGHT: 61 IN | BODY MASS INDEX: 42.75 KG/M2 | DIASTOLIC BLOOD PRESSURE: 76 MMHG | WEIGHT: 226.44 LBS | SYSTOLIC BLOOD PRESSURE: 128 MMHG

## 2023-12-11 DIAGNOSIS — Z48.02 ENCOUNTER FOR STAPLE REMOVAL: Primary | ICD-10-CM

## 2023-12-11 DIAGNOSIS — Z09 POSTOPERATIVE FOLLOW-UP: ICD-10-CM

## 2023-12-11 PROCEDURE — 99999 PR PBB SHADOW E&M-EST. PATIENT-LVL III: ICD-10-PCS | Mod: PBBFAC,,, | Performed by: OBSTETRICS & GYNECOLOGY

## 2023-12-11 PROCEDURE — 59430 PR CARE AFTER DELIVERY ONLY: ICD-10-PCS | Mod: ,,, | Performed by: OBSTETRICS & GYNECOLOGY

## 2023-12-11 PROCEDURE — 99213 OFFICE O/P EST LOW 20 MIN: CPT | Mod: PBBFAC | Performed by: OBSTETRICS & GYNECOLOGY

## 2023-12-11 PROCEDURE — 99999 PR PBB SHADOW E&M-EST. PATIENT-LVL III: CPT | Mod: PBBFAC,,, | Performed by: OBSTETRICS & GYNECOLOGY

## 2023-12-11 NOTE — PROGRESS NOTES
Subjective:       Patient ID: Kate Gomez is a 33 y.o. female.    Chief Complaint:  Post-op Evaluation (Pt here 1 week PO/PP with no complaints) and Postpartum Care      History of Present Illness  Patient presents for 1 week postoperative follow-up from  section.  Patient states her pain is under control she is minimal bleeding she is voiding well and has good appetite.  Patient has no complaints or signs of depression.    Menstrual History:  OB History          3    Para   3    Term   3            AB        Living   3         SAB        IAB        Ectopic        Multiple   0    Live Births   3                Menarche age:  Patient's last menstrual period was 2023.         Review of Systems  Review of Systems   Constitutional:  Negative for activity change, appetite change, chills, diaphoresis, fatigue, fever and unexpected weight change.   HENT:  Negative for congestion, dental problem, drooling, ear discharge, ear pain, facial swelling, hearing loss, mouth sores, nosebleeds, postnasal drip, rhinorrhea, sinus pressure, sneezing, sore throat, tinnitus, trouble swallowing and voice change.    Eyes:  Negative for photophobia, pain, discharge, redness, itching and visual disturbance.   Respiratory:  Negative for apnea, cough, choking, chest tightness, shortness of breath, wheezing and stridor.    Cardiovascular:  Negative for chest pain, palpitations and leg swelling.   Gastrointestinal:  Negative for abdominal distention, abdominal pain, anal bleeding, blood in stool, constipation, diarrhea, nausea, rectal pain and vomiting.   Endocrine: Negative for cold intolerance, heat intolerance, polydipsia, polyphagia and polyuria.   Genitourinary:  Negative for decreased urine volume, difficulty urinating, dyspareunia, dysuria, enuresis, flank pain, frequency, genital sores, hematuria, menstrual problem, pelvic pain, urgency, vaginal bleeding, vaginal discharge and vaginal pain.    Musculoskeletal:  Negative for arthralgias, back pain, gait problem, joint swelling, myalgias, neck pain and neck stiffness.   Skin:  Negative for color change, pallor, rash and wound.   Allergic/Immunologic: Negative for environmental allergies, food allergies and immunocompromised state.   Neurological:  Negative for dizziness, tremors, seizures, syncope, facial asymmetry, speech difficulty, weakness, light-headedness, numbness and headaches.   Hematological:  Negative for adenopathy. Does not bruise/bleed easily.   Psychiatric/Behavioral:  Negative for agitation, behavioral problems, confusion, decreased concentration, dysphoric mood, hallucinations, self-injury, sleep disturbance and suicidal ideas. The patient is not nervous/anxious and is not hyperactive.            Objective:      Physical Exam  Vitals and nursing note reviewed.        Dressing previously removed.  Staples removed.  Steri-Strips placed.  Incision clean dry and intact.  Abdomen soft nontender     Assessment:        1. Encounter for staple removal    2. Postoperative follow-up                Plan:         Kate was seen today for post-op evaluation and postpartum care.    Diagnoses and all orders for this visit:    Encounter for staple removal    Postoperative follow-up

## 2023-12-19 ENCOUNTER — PATIENT MESSAGE (OUTPATIENT)
Dept: OBSTETRICS AND GYNECOLOGY | Facility: CLINIC | Age: 33
End: 2023-12-19
Payer: MEDICAID

## 2023-12-26 ENCOUNTER — TELEPHONE (OUTPATIENT)
Dept: OBSTETRICS AND GYNECOLOGY | Facility: CLINIC | Age: 33
End: 2023-12-26
Payer: MEDICAID

## 2023-12-26 NOTE — TELEPHONE ENCOUNTER
----- Message from Thania Hernandez sent at 12/26/2023 11:31 AM CST -----  Contact: Self  Kate Gomez  MRN: [id number]  Home Phone      [phone]  Work Phone      Not on file.  Mobile          [phone]    Patient Care Team:  No, Primary Doctor as PCP - Isael Chavez MD as Obstetrician (Obstetrics)  OB? No  What phone number can you be reached at? [phone]  Message: pt would like to see about getting Friday's appt moved up to an earlier time

## 2024-01-08 ENCOUNTER — POSTPARTUM VISIT (OUTPATIENT)
Dept: OBSTETRICS AND GYNECOLOGY | Facility: CLINIC | Age: 34
End: 2024-01-08

## 2024-01-08 VITALS
HEART RATE: 100 BPM | BODY MASS INDEX: 40.69 KG/M2 | WEIGHT: 215.5 LBS | DIASTOLIC BLOOD PRESSURE: 66 MMHG | SYSTOLIC BLOOD PRESSURE: 122 MMHG | HEIGHT: 61 IN

## 2024-01-08 DIAGNOSIS — Z09 POSTOPERATIVE FOLLOW-UP: Primary | ICD-10-CM

## 2024-01-08 PROCEDURE — 99213 OFFICE O/P EST LOW 20 MIN: CPT | Mod: PBBFAC | Performed by: OBSTETRICS & GYNECOLOGY

## 2024-01-08 PROCEDURE — 99024 POSTOP FOLLOW-UP VISIT: CPT | Mod: ,,, | Performed by: OBSTETRICS & GYNECOLOGY

## 2024-01-08 PROCEDURE — 99999 PR PBB SHADOW E&M-EST. PATIENT-LVL III: CPT | Mod: PBBFAC,,, | Performed by: OBSTETRICS & GYNECOLOGY

## 2024-01-08 NOTE — PROGRESS NOTES
Subjective:       Patient ID: Kate Gomez is a 33 y.o. female.    Chief Complaint:  Postpartum Care (Pt here 5 week PP with no complaints)      History of Present Illness  Patient presents for 4 week follow-up from a  section.  Patient currently denies any pain or bleeding.  She is voiding well and has good appetite.  Patient does admit to having had intercourse.  Patient counseled against this until 6 weeks postop..  Patient does not believe she will be interested in contraception.  Her  is going to have a vasectomy.  She is otherwise without gyn complaints.    Menstrual History:  OB History          3    Para   3    Term   3            AB        Living   3         SAB        IAB        Ectopic        Multiple   0    Live Births   3                Menarche age:  Patient's last menstrual period was 2023.         Review of Systems  Review of Systems   Constitutional:  Negative for activity change, appetite change, chills, diaphoresis, fatigue, fever and unexpected weight change.   HENT:  Negative for congestion, dental problem, drooling, ear discharge, ear pain, facial swelling, hearing loss, mouth sores, nosebleeds, postnasal drip, rhinorrhea, sinus pressure, sneezing, sore throat, tinnitus, trouble swallowing and voice change.    Eyes:  Negative for photophobia, pain, discharge, redness, itching and visual disturbance.   Respiratory:  Negative for apnea, cough, choking, chest tightness, shortness of breath, wheezing and stridor.    Cardiovascular:  Negative for chest pain, palpitations and leg swelling.   Gastrointestinal:  Negative for abdominal distention, abdominal pain, anal bleeding, blood in stool, constipation, diarrhea, nausea, rectal pain and vomiting.   Endocrine: Negative for cold intolerance, heat intolerance, polydipsia, polyphagia and polyuria.   Genitourinary:  Negative for decreased urine volume, difficulty urinating, dyspareunia, dysuria, enuresis, flank pain,  frequency, genital sores, hematuria, menstrual problem, pelvic pain, urgency, vaginal bleeding, vaginal discharge and vaginal pain.   Musculoskeletal:  Negative for arthralgias, back pain, gait problem, joint swelling, myalgias, neck pain and neck stiffness.   Skin:  Negative for color change, pallor, rash and wound.   Allergic/Immunologic: Negative for environmental allergies, food allergies and immunocompromised state.   Neurological:  Negative for dizziness, tremors, seizures, syncope, facial asymmetry, speech difficulty, weakness, light-headedness, numbness and headaches.   Hematological:  Negative for adenopathy. Does not bruise/bleed easily.   Psychiatric/Behavioral:  Negative for agitation, behavioral problems, confusion, decreased concentration, dysphoric mood, hallucinations, self-injury, sleep disturbance and suicidal ideas. The patient is not nervous/anxious and is not hyperactive.            Objective:      Physical Exam  Vitals and nursing note reviewed.     Incision clean dry and intact.  Abdomen soft nontender        Assessment:      No diagnosis found.            Plan:         There are no diagnoses linked to this encounter.

## 2024-01-19 ENCOUNTER — POSTPARTUM VISIT (OUTPATIENT)
Dept: OBSTETRICS AND GYNECOLOGY | Facility: CLINIC | Age: 34
End: 2024-01-19

## 2024-01-19 VITALS
HEIGHT: 61 IN | WEIGHT: 220 LBS | HEART RATE: 98 BPM | SYSTOLIC BLOOD PRESSURE: 120 MMHG | DIASTOLIC BLOOD PRESSURE: 76 MMHG | BODY MASS INDEX: 41.54 KG/M2

## 2024-01-19 DIAGNOSIS — Z09 POSTOPERATIVE FOLLOW-UP: Primary | ICD-10-CM

## 2024-01-19 PROCEDURE — 99213 OFFICE O/P EST LOW 20 MIN: CPT | Mod: PBBFAC | Performed by: OBSTETRICS & GYNECOLOGY

## 2024-01-19 PROCEDURE — 99999 PR PBB SHADOW E&M-EST. PATIENT-LVL III: CPT | Mod: PBBFAC,,, | Performed by: OBSTETRICS & GYNECOLOGY

## 2024-01-19 PROCEDURE — 99024 POSTOP FOLLOW-UP VISIT: CPT | Mod: ,,, | Performed by: OBSTETRICS & GYNECOLOGY

## 2024-01-19 NOTE — PROGRESS NOTES
Subjective:       Patient ID: Kate Gomez is a 33 y.o. female.    Chief Complaint:  Postpartum Care (Pt here 6 week PP with no complaints )      History of Present Illness  Patient presents for 6 week postoperative follow-up from  section.  Patient denies any pain or bleeding.  She is voiding well and has good appetite.  Patient does not desire contraception at this time.  She is otherwise without gyn complaints.    Menstrual History:  OB History          3    Para   3    Term   3            AB        Living   3         SAB        IAB        Ectopic        Multiple   0    Live Births   3                Menarche age:  No LMP recorded.         Review of Systems  Review of Systems   Constitutional:  Negative for activity change, appetite change, chills, diaphoresis, fatigue, fever and unexpected weight change.   HENT:  Negative for congestion, dental problem, drooling, ear discharge, ear pain, facial swelling, hearing loss, mouth sores, nosebleeds, postnasal drip, rhinorrhea, sinus pressure, sneezing, sore throat, tinnitus, trouble swallowing and voice change.    Eyes:  Negative for photophobia, pain, discharge, redness, itching and visual disturbance.   Respiratory:  Negative for apnea, cough, choking, chest tightness, shortness of breath, wheezing and stridor.    Cardiovascular:  Negative for chest pain, palpitations and leg swelling.   Gastrointestinal:  Negative for abdominal distention, abdominal pain, anal bleeding, blood in stool, constipation, diarrhea, nausea, rectal pain and vomiting.   Endocrine: Negative for cold intolerance, heat intolerance, polydipsia, polyphagia and polyuria.   Genitourinary:  Negative for decreased urine volume, difficulty urinating, dyspareunia, dysuria, enuresis, flank pain, frequency, genital sores, hematuria, menstrual problem, pelvic pain, urgency, vaginal bleeding, vaginal discharge and vaginal pain.   Musculoskeletal:  Negative for arthralgias, back pain,  gait problem, joint swelling, myalgias, neck pain and neck stiffness.   Skin:  Negative for color change, pallor, rash and wound.   Allergic/Immunologic: Negative for environmental allergies, food allergies and immunocompromised state.   Neurological:  Negative for dizziness, tremors, seizures, syncope, facial asymmetry, speech difficulty, weakness, light-headedness, numbness and headaches.   Hematological:  Negative for adenopathy. Does not bruise/bleed easily.   Psychiatric/Behavioral:  Negative for agitation, behavioral problems, confusion, decreased concentration, dysphoric mood, hallucinations, self-injury, sleep disturbance and suicidal ideas. The patient is not nervous/anxious and is not hyperactive.          Objective:      Physical Exam  Vitals and nursing note reviewed.         Assessment:      No diagnosis found.            Plan:         There are no diagnoses linked to this encounter.

## 2024-11-14 ENCOUNTER — OFFICE VISIT (OUTPATIENT)
Dept: OBSTETRICS AND GYNECOLOGY | Facility: CLINIC | Age: 34
End: 2024-11-14

## 2024-11-14 ENCOUNTER — PATIENT MESSAGE (OUTPATIENT)
Dept: OBSTETRICS AND GYNECOLOGY | Facility: CLINIC | Age: 34
End: 2024-11-14

## 2024-11-14 VITALS
DIASTOLIC BLOOD PRESSURE: 70 MMHG | HEIGHT: 61 IN | BODY MASS INDEX: 50.22 KG/M2 | HEART RATE: 103 BPM | SYSTOLIC BLOOD PRESSURE: 124 MMHG | WEIGHT: 266 LBS

## 2024-11-14 DIAGNOSIS — Z01.419 ENCOUNTER FOR GYNECOLOGICAL EXAMINATION WITHOUT ABNORMAL FINDING: Primary | ICD-10-CM

## 2024-11-14 PROCEDURE — 99213 OFFICE O/P EST LOW 20 MIN: CPT | Mod: PBBFAC | Performed by: OBSTETRICS & GYNECOLOGY

## 2024-11-14 PROCEDURE — 99999 PR PBB SHADOW E&M-EST. PATIENT-LVL III: CPT | Mod: PBBFAC,,, | Performed by: OBSTETRICS & GYNECOLOGY

## 2024-11-14 NOTE — PROGRESS NOTES
"Subjective:    Patient ID: Kate Gomez is a 33 y.o. female    Chief Complaint: No chief complaint on file.      History of Present Illness:  Kate presents today desiring contraception. No LMP recorded.. Her menstrual cycles are described as {Misc; menses description:36998::"regular every month without intermenstrual spotting, usually lasting less than 6 days, with minimal cramping"}. Her current method of contraception is {contraceptive methods:5051}. All forms of non-hormonal and hormonal contraception were discussed with the patient. We discussed both combination and progesterone only contraception including all risks, benefits, and alternatives. Patient would like to proceed with ***. History has been reviewed and no contraindications have been identified.  Discussed with patient instructions on taking the birth control as well as safe sex practices. Patient understands that birth control does not protect against STDs.       ROS:   CONSTITUTIONAL: {:326433:: Negative for fever, chills, diaphoresis, weakness, fatigue, weight loss, weight gain}  GASTROINTESTINAL: {:417461::negative for abdominal pain, flank pain, nausea, vomiting, diarrhea, constipation, black stool, blood in stool}  BREAST: {:01287::negative for breast  tenderness, breast mass, nipple discharge, or skin changes}  GENITOURINARY: {:423180::negative for dysuria, frequency/urgency, hematuria, genital discharge, vaginal bleeding, irregular menses, heavy menses, pelvic pain}  HEMATOLOGIC/LYMPHATIC: {:457381::negative for swollen lymph nodes, bleeding, bruising}  MUSCULOSKELETAL: {:599605::negative for back pain, joint pain, joint stiffness, joint swelling, muscle pain, muscle weakness}  ENDOCRINE: {:018331::negative for polydipsia/polyuria, palpitations, skin changes, temperature intolerance, unexpected weight changes}      Objective:    Vital Signs:  There were no vitals filed for this visit.    Physical Exam:  General:  {Exam; " "general:930823774:x:"alert,normal appearing gravid female"}   Skin:  {Findings; skin exam-one line:98131:x:"Skin color, texture, turgor normal. No rashes or lesions"}   Abdomen:  {Exam; abdomen:177021649:x:"soft, non-tender; bowel sounds normal"}   Pelvis: External genitalia: {ext genitalia exam:29680:x:"normal general appearance"}  Urinary system: {gu exam:56678:x:"urethral meatus normal, bladder nontender"}  Vaginal: {vaginal exam:89171:x:"normal mucosa without prolapse or lesions"}  Cervix: {exam; gyn cervix:90510:x:"normal appearance"}  Uterus: {uterus exam:80258:x:"normal size, shape, position"}  Adnexa: {pelvic adnexa exam:31949:x:"normal size, nontender bilaterally"}         Assessment:      No diagnosis found.    Plan:      1. ***  2. ***  3. ***  "

## 2024-11-14 NOTE — PROGRESS NOTES
Subjective:       Patient ID: Kate Gomez is a 33 y.o. female.    Chief Complaint:  Annual Exam (Well woman exam, Pt c/o bleeding more than usual on her cycle )      History of Present Illness  Patient presents for annual exam.  Patient does admit that her cycles are very heavy.  They only last 3 days but she states she just puts on a diaper.  Patient is not interested in treatment at this time as she is considering another pregnancy.  She is otherwise without gyn complaints.    Menstrual History:  OB History          3    Para   3    Term   3            AB        Living   3         SAB        IAB        Ectopic        Multiple   0    Live Births   3                Menarche age:  Patient's last menstrual period was 2024.         Review of Systems  Review of Systems   Constitutional:  Negative for activity change, appetite change, chills, diaphoresis, fatigue, fever and unexpected weight change.   HENT:  Positive for drooling. Negative for congestion, dental problem, ear discharge, ear pain, facial swelling, hearing loss, mouth sores, nosebleeds, postnasal drip, rhinorrhea, sinus pressure, sneezing, sore throat, tinnitus, trouble swallowing and voice change.    Eyes:  Negative for photophobia, pain, discharge, redness, itching and visual disturbance.   Respiratory:  Negative for apnea, cough, choking, chest tightness, shortness of breath, wheezing and stridor.    Cardiovascular:  Negative for chest pain, palpitations and leg swelling.   Gastrointestinal:  Negative for abdominal distention, abdominal pain, anal bleeding, blood in stool, constipation, diarrhea, nausea, rectal pain and vomiting.   Endocrine: Negative for cold intolerance, heat intolerance, polydipsia, polyphagia and polyuria.   Genitourinary:  Negative for decreased urine volume, difficulty urinating, dyspareunia, dysuria, enuresis, flank pain, frequency, genital sores, hematuria, menstrual problem, pelvic pain, urgency, vaginal  bleeding, vaginal discharge and vaginal pain.   Musculoskeletal:  Positive for back pain. Negative for arthralgias, gait problem, joint swelling, myalgias, neck pain and neck stiffness.   Skin:  Negative for color change, pallor, rash and wound.   Allergic/Immunologic: Negative for environmental allergies, food allergies and immunocompromised state.   Neurological:  Negative for dizziness, tremors, seizures, syncope, facial asymmetry, speech difficulty, weakness, light-headedness, numbness and headaches.   Hematological:  Negative for adenopathy. Does not bruise/bleed easily.   Psychiatric/Behavioral:  Negative for agitation, behavioral problems, confusion, decreased concentration, dysphoric mood, hallucinations, self-injury, sleep disturbance and suicidal ideas. The patient is not nervous/anxious and is not hyperactive.          Objective:      Physical Exam  Vitals and nursing note reviewed. Exam conducted with a chaperone present.   Constitutional:       Appearance: She is well-developed.   Neck:      Thyroid: No thyromegaly.   Cardiovascular:      Rate and Rhythm: Normal rate and regular rhythm.   Pulmonary:      Effort: Pulmonary effort is normal.      Breath sounds: Normal breath sounds.   Chest:   Breasts:     Breasts are symmetrical.      Right: No inverted nipple, mass, nipple discharge, skin change or tenderness.      Left: No inverted nipple, mass, nipple discharge, skin change or tenderness.   Abdominal:      General: Bowel sounds are normal.      Palpations: Abdomen is soft. There is no mass.      Tenderness: There is no abdominal tenderness.      Hernia: There is no hernia in the left inguinal area or right inguinal area.   Genitourinary:     General: Normal vulva.      Labia:         Right: No rash, tenderness, lesion or injury.         Left: No rash, tenderness, lesion or injury.       Urethra: No prolapse, urethral pain, urethral swelling or urethral lesion.      Vagina: No signs of injury and  foreign body. No vaginal discharge, erythema, tenderness, bleeding, lesions or prolapsed vaginal walls.      Cervix: No cervical motion tenderness, discharge, friability, lesion, erythema, cervical bleeding or eversion.      Uterus: Not deviated, not enlarged, not fixed, not tender and no uterine prolapse.       Adnexa:         Right: No mass, tenderness or fullness.          Left: No mass, tenderness or fullness.        Rectum: No external hemorrhoid.   Musculoskeletal:         General: Normal range of motion.   Lymphadenopathy:      Lower Body: No right inguinal adenopathy. No left inguinal adenopathy.   Skin:     General: Skin is dry.   Neurological:      Mental Status: She is alert and oriented to person, place, and time.      Deep Tendon Reflexes: Reflexes are normal and symmetric.   Psychiatric:         Behavior: Behavior normal.         Thought Content: Thought content normal.         Judgment: Judgment normal.         Assessment:        1. Encounter for gynecological examination without abnormal finding                Plan:         Kate was seen today for annual exam.    Diagnoses and all orders for this visit:    Encounter for gynecological examination without abnormal finding

## 2025-02-11 ENCOUNTER — PATIENT MESSAGE (OUTPATIENT)
Dept: OBSTETRICS AND GYNECOLOGY | Facility: CLINIC | Age: 35
End: 2025-02-11

## 2025-02-11 ENCOUNTER — E-VISIT (OUTPATIENT)
Dept: OBSTETRICS AND GYNECOLOGY | Facility: CLINIC | Age: 35
End: 2025-02-11

## 2025-02-11 DIAGNOSIS — B37.31 VAGINAL YEAST INFECTION: Primary | ICD-10-CM

## 2025-02-11 RX ORDER — TERCONAZOLE 8 MG/G
1 CREAM VAGINAL DAILY
Qty: 20 G | Refills: 1 | Status: SHIPPED | OUTPATIENT
Start: 2025-02-11

## 2025-02-11 NOTE — PROGRESS NOTES
Patient ID: Kate Gomez is a 34 y.o. female.    Chief Complaint: Vaginal Discharge (Entered automatically based on patient selection in [employer].)    The patient initiated a request through [employer] on 2/11/2025 for evaluation and management with a chief complaint of Vaginal Discharge (Entered automatically based on patient selection in [employer].)     I evaluated the questionnaire submission on February 11, 2025.    [facility] P[facility] Evisit Vaginal Concerns    2/11/2025  9:38 AM CST - Filed by Patient   Do you agree to participate in an E-Visit? Yes   If you have any of the following symptoms,  please do not complete an E-Visit,  schedule an appointment with your provider: I acknowledge   Choose the state of your primary residence [place]   Do you have any of the following pregnancy-related conditions? None   What is the main issue you would like addressed today? Yeast infection   Which of the following vaginal concerns do you have? Itching   Do you have vaginal discharge? White/milky discharge   Do you have pain while passing urine? Yes   Do you have any of the following symptoms? None of the above    Have you taken antibiotics in the last two weeks? No    Do you use any of the following? No   Which of the following applies to your menstrual period? Expect soon   Which of the following applies to your menstrual cycle? Heavier bleeding than normal   Do you have spotting between periods? No   Do you have pain with your period? Yes   What type of pain do you have with your period? Cramping   Have you had similar symptoms in the past? Frequently   When you had similar symptoms in the past, did any of the following work? Cream for yeast infection   Have you had a temperature of 100.4 or higher? No   Provide any additional information you feel is important.    Please attach any relevant images or files    Are you able to take your vital signs? No         Encounter Diagnosis   Name Primary?    Vaginal yeast infection Yes         No orders of the defined types were placed in this encounter.           No follow-ups on file.      E-Visit Time Tracking:    Day 1 Time (in minutes): 5    Total Time (in minutes): 5